# Patient Record
Sex: MALE | Race: ASIAN | NOT HISPANIC OR LATINO | Employment: FULL TIME | URBAN - METROPOLITAN AREA
[De-identification: names, ages, dates, MRNs, and addresses within clinical notes are randomized per-mention and may not be internally consistent; named-entity substitution may affect disease eponyms.]

---

## 2017-05-10 ENCOUNTER — TELEPHONE (OUTPATIENT)
Dept: LAB | Facility: CLINIC | Age: 47
End: 2017-05-10

## 2017-05-10 DIAGNOSIS — E11.9 DIABETES MELLITUS WITHOUT COMPLICATION (H): ICD-10-CM

## 2017-05-10 DIAGNOSIS — E78.5 HYPERLIPIDEMIA LDL GOAL <100: Primary | ICD-10-CM

## 2017-05-10 NOTE — TELEPHONE ENCOUNTER
Pt is only due for lipid, LFT, and microalbumin this time. Please check on with him if other doctor wants him to do the rest of them. Plus, there is no comprehensive vitamin panel I could offer. Please let him know  thx    .

## 2017-05-10 NOTE — TELEPHONE ENCOUNTER
Left message on answering machine for patient to call back.    Gladys BuenrostroRN  [facility]  [phone]

## 2017-05-11 DIAGNOSIS — E78.5 HYPERLIPIDEMIA LDL GOAL <100: ICD-10-CM

## 2017-05-11 DIAGNOSIS — E11.9 DIABETES MELLITUS WITHOUT COMPLICATION (H): ICD-10-CM

## 2017-05-11 LAB
ALBUMIN SERPL-MCNC: 4.2 G/DL (ref 3.4–5)
ALP SERPL-CCNC: 93 U/L (ref 40–150)
ALT SERPL W P-5'-P-CCNC: 52 U/L (ref 0–70)
AST SERPL W P-5'-P-CCNC: 23 U/L (ref 0–45)
BILIRUB DIRECT SERPL-MCNC: <0.1 MG/DL (ref 0–0.2)
BILIRUB SERPL-MCNC: 0.5 MG/DL (ref 0.2–1.3)
CHOLEST SERPL-MCNC: 205 MG/DL
CREAT UR-MCNC: 274 MG/DL
HDLC SERPL-MCNC: 39 MG/DL
LDLC SERPL CALC-MCNC: 141 MG/DL
MICROALBUMIN UR-MCNC: 17 MG/L
MICROALBUMIN/CREAT UR: 6.13 MG/G CR (ref 0–17)
NONHDLC SERPL-MCNC: 166 MG/DL
PROT SERPL-MCNC: 8 G/DL (ref 6.8–8.8)
TRIGL SERPL-MCNC: 127 MG/DL

## 2017-05-11 PROCEDURE — 80076 HEPATIC FUNCTION PANEL: CPT | Performed by: FAMILY MEDICINE

## 2017-05-11 PROCEDURE — 82043 UR ALBUMIN QUANTITATIVE: CPT | Performed by: FAMILY MEDICINE

## 2017-05-11 PROCEDURE — 80061 LIPID PANEL: CPT | Performed by: FAMILY MEDICINE

## 2017-05-11 PROCEDURE — 36415 COLL VENOUS BLD VENIPUNCTURE: CPT | Performed by: FAMILY MEDICINE

## 2017-05-11 NOTE — TELEPHONE ENCOUNTER
jacqueline notified of Dr. Samuels message- he will schedule appointment next week to discuss labs and other questions  '  Gladys Buenrostro RN  [facility]  [phone]

## 2017-05-15 ENCOUNTER — OFFICE VISIT (OUTPATIENT)
Dept: FAMILY MEDICINE | Facility: CLINIC | Age: 47
End: 2017-05-15
Payer: COMMERCIAL

## 2017-05-15 VITALS
SYSTOLIC BLOOD PRESSURE: 124 MMHG | TEMPERATURE: 97.9 F | DIASTOLIC BLOOD PRESSURE: 80 MMHG | BODY MASS INDEX: 24.66 KG/M2 | WEIGHT: 134 LBS | HEART RATE: 68 BPM | HEIGHT: 62 IN | OXYGEN SATURATION: 98 %

## 2017-05-15 DIAGNOSIS — Z82.49 FAMILY HISTORY OF EARLY CAD: ICD-10-CM

## 2017-05-15 DIAGNOSIS — E11.9 DIABETES MELLITUS WITHOUT COMPLICATION (H): ICD-10-CM

## 2017-05-15 DIAGNOSIS — E78.5 HYPERLIPIDEMIA LDL GOAL <100: Primary | ICD-10-CM

## 2017-05-15 PROCEDURE — 99213 OFFICE O/P EST LOW 20 MIN: CPT | Performed by: FAMILY MEDICINE

## 2017-05-15 NOTE — NURSING NOTE
"Chief Complaint   Patient presents with     Results       Initial /80 (BP Location: Left arm, Patient Position: Chair, Cuff Size: Adult Regular)  Pulse 68  Temp 97.9  F (36.6  C) (Tympanic)  Ht 5' 1.5\" (1.562 m)  Wt 134 lb (60.8 kg)  SpO2 98%  BMI 24.91 kg/m2 Estimated body mass index is 24.91 kg/(m^2) as calculated from the following:    Height as of this encounter: 5' 1.5\" (1.562 m).    Weight as of this encounter: 134 lb (60.8 kg).  Medication Reconciliation: complete     Solange Rascon CMA      "

## 2017-05-15 NOTE — PROGRESS NOTES
SUBJECTIVE:                                                    Tanner Olvera is a 46 year old male who presents to clinic today for the following health issues:      Lab results        Problem list and histories reviewed & adjusted, as indicated.  Additional history: as documented    Patient Active Problem List   Diagnosis     Hyperlipidemia LDL goal <100     Family history of early CAD     Degenerative disc disease     Vitamin D deficiency     Abnormal thyroid blood test     Diabetes mellitus without complication (H)     Past Surgical History:   Procedure Laterality Date     NO HISTORY OF SURGERY         Social History   Substance Use Topics     Smoking status: Never Smoker     Smokeless tobacco: Never Used     Alcohol use No      Comment: nothing     Family History   Problem Relation Age of Onset     C.A.D. Father      Asthma No family hx of      DIABETES No family hx of      Hypertension No family hx of      Cancer - colorectal No family hx of      Prostate Cancer No family hx of      Anesthesia Reaction No family hx of      Blood Disease No family hx of      Eye Disorder No family hx of      OSTEOPOROSIS No family hx of      Thyroid Disease No family hx of          Current Outpatient Prescriptions   Medication Sig Dispense Refill     aspirin 81 MG tablet Take 81 mg by mouth daily       Allergies   Allergen Reactions     Penicillins Rash     Recent Labs   Lab Test  05/11/17   0926  12/23/16   1110  10/13/16   1030  07/18/16   0900  12/17/14   0806   A1C   --   6.1*   --   6.1*  5.5   LDL  141*  144*   --   127*  95   HDL  39*  38*   --   36*  46   TRIG  127  145   --   161*  153*   ALT  52  65   --   56  45   CR   --   0.90  0.77  0.94  0.91   GFRESTIMATED   --   >90  Non  GFR Calc    >90  Non  GFR Calc    87  >90  Non  GFR Calc     GFRESTBLACK   --   >90   GFR Calc    >90   GFR Calc    >90   GFR Calc     ">90   GFR Calc     POTASSIUM   --   3.9  3.9  4.0  4.4   TSH   --   4.66*  3.62  4.10*  3.07      BP Readings from Last 3 Encounters:   05/15/17 124/80   12/22/16 132/84   10/14/16 122/70    Wt Readings from Last 3 Encounters:   05/15/17 134 lb (60.8 kg)   12/22/16 129 lb (58.5 kg)   10/14/16 127 lb (57.6 kg)                    Reviewed and updated as needed this visit by clinical staff       Reviewed and updated as needed this visit by Provider         ROS:  Constitutional, HEENT, cardiovascular, pulmonary, gi and gu systems are negative, except as otherwise noted.    OBJECTIVE:                                                    /80 (BP Location: Left arm, Patient Position: Chair, Cuff Size: Adult Regular)  Pulse 68  Temp 97.9  F (36.6  C) (Tympanic)  Ht 5' 1.5\" (1.562 m)  Wt 134 lb (60.8 kg)  SpO2 98%  BMI 24.91 kg/m2  Body mass index is 24.91 kg/(m^2).  GENERAL: healthy, alert and no distress  NECK: no adenopathy, no asymmetry, masses, or scars and thyroid normal to palpation  RESP: lungs clear to auscultation - no rales, rhonchi or wheezes  CV: regular rate and rhythm, normal S1 S2, no S3 or S4, no murmur, click or rub, no peripheral edema and peripheral pulses strong  ABDOMEN: soft, nontender, no hepatosplenomegaly, no masses and bowel sounds normal  MS: no gross musculoskeletal defects noted, no edema         ASSESSMENT/PLAN:                                                    Tanner was seen today for results.    Diagnoses and all orders for this visit:    Hyperlipidemia LDL goal <100    Family history of early CAD    Diabetes mellitus without complication (H)      Has slightly elevated LDL but otherwise very stable results on recent lab test  Will have him to keep working on cardio exercise and rehydration, also encourage him to keep taking B complex and vitamin D, also encouraged him to add Omega 3 fatty acid(plant based)  Will recheck fasting lab in December(CPE)     Vishal Samuels, " MD  [facility] [place]

## 2017-05-15 NOTE — MR AVS SNAPSHOT
"              After Visit Summary   5/15/2017    Tanner Olvera    MRN: [id number]           Patient Information     Date Of Birth          1970        Visit Information        Provider Department      5/15/2017 2:40 PM Vishal Samuels MD [facility] [place]        Today's Diagnoses     Hyperlipidemia LDL goal <100    -  1    Family history of early CAD        Diabetes mellitus without complication (H)           Follow-ups after your visit        Who to contact     If you have questions or need follow up information about today's clinic visit or your schedule please contact [facility] [place] directly at [phone].  Normal or non-critical lab and imaging results will be communicated to you by paOndehart, letter or phone within 4 business days after the clinic has received the results. If you do not hear from us within 7 days, please contact the clinic through paOndehart or phone. If you have a critical or abnormal lab result, we will notify you by phone as soon as possible.  Submit refill requests through [employer] or call your pharmacy and they will forward the refill request to us. Please allow 3 business days for your refill to be completed.          Additional Information About Your Visit        MyChart Information     [employer] gives you secure access to your electronic health record. If you see a primary care provider, you can also send messages to your care team and make appointments. If you have questions, please call your primary care clinic.  If you do not have a primary care provider, please call [phone] and they will assist you.        Care EveryWhere ID     This is your Care EveryWhere ID. This could be used by other organizations to access your [facility] medical records  [id number]        Your Vitals Were     Pulse Temperature Height Pulse Oximetry BMI (Body Mass Index)       68 97.9  F (36.6  C) (Tympanic) 5' 1.5\" (1.562 m) 98% 24.91 kg/m2        Blood Pressure from Last 3 " Encounters:   05/15/17 124/80   12/22/16 132/84   10/14/16 122/70    Weight from Last 3 Encounters:   05/15/17 134 lb (60.8 kg)   12/22/16 129 lb (58.5 kg)   10/14/16 127 lb (57.6 kg)              Today, you had the following     No orders found for display       Primary Care Provider Office Phone # Fax #    Vishal Samuels [phone]       [address] [address]        Thank you!     Thank you for choosing [facility]  for your care. Our goal is always to provide you with excellent care. Hearing back from our patients is one way we can continue to improve our services. Please take a few minutes to complete the written survey that you may receive in the mail after your visit with us. Thank you!             Your Updated Medication List - Protect others around you: Learn how to safely use, store and throw away your medicines at www.disposemymeds.org.          This list is accurate as of: 5/15/17  3:02 PM.  Always use your most recent med list.                   Brand Name Dispense Instructions for use    aspirin 81 MG tablet      Take 81 mg by mouth daily

## 2017-12-14 ENCOUNTER — OFFICE VISIT (OUTPATIENT)
Dept: FAMILY MEDICINE | Facility: CLINIC | Age: 47
End: 2017-12-14
Payer: COMMERCIAL

## 2017-12-14 VITALS
RESPIRATION RATE: 16 BRPM | HEIGHT: 62 IN | WEIGHT: 133 LBS | TEMPERATURE: 97.8 F | HEART RATE: 100 BPM | DIASTOLIC BLOOD PRESSURE: 80 MMHG | BODY MASS INDEX: 24.48 KG/M2 | SYSTOLIC BLOOD PRESSURE: 119 MMHG | OXYGEN SATURATION: 99 %

## 2017-12-14 DIAGNOSIS — E11.9 DIABETES MELLITUS WITHOUT COMPLICATION (H): ICD-10-CM

## 2017-12-14 DIAGNOSIS — Z00.00 HEALTH CARE MAINTENANCE: Primary | ICD-10-CM

## 2017-12-14 DIAGNOSIS — Z13.89 SCREENING FOR DIABETIC PERIPHERAL NEUROPATHY: ICD-10-CM

## 2017-12-14 PROCEDURE — 99396 PREV VISIT EST AGE 40-64: CPT | Performed by: FAMILY MEDICINE

## 2017-12-14 NOTE — NURSING NOTE
"Chief Complaint   Patient presents with     Physical       Initial /80 (Cuff Size: Adult Regular)  Pulse 100  Temp 97.8  F (36.6  C) (Tympanic)  Resp 16  Ht 5' 1.5\" (1.562 m)  Wt 133 lb (60.3 kg)  SpO2 99%  BMI 24.72 kg/m2 Estimated body mass index is 24.72 kg/(m^2) as calculated from the following:    Height as of this encounter: 5' 1.5\" (1.562 m).    Weight as of this encounter: 133 lb (60.3 kg).  Medication Reconciliation: complete   Mary White, CMA    "

## 2017-12-14 NOTE — MR AVS SNAPSHOT
After Visit Summary   12/14/2017    Tanner Olvera    MRN: [id number]           Patient Information     Date Of Birth          1970        Visit Information        Provider Department      12/14/2017 3:00 PM Vishal Samuels MD [facility]        Today's Diagnoses     Health care maintenance    -  1    Screening for diabetic peripheral neuropathy        Diabetes mellitus without complication (H)          Care Instructions      Preventive Health Recommendations  Male Ages 40 to 49    Yearly exam:             See your health care provider every year in order to  o   Review health changes.   o   Discuss preventive care.    o   Review your medicines if your doctor has prescribed any.    You should be tested each year for STDs (sexually transmitted diseases) if you re at risk.     Have a cholesterol test every 5 years.     Have a colonoscopy (test for colon cancer) if someone in your family has had colon cancer or polyps before age 50.     After age 45, have a diabetes test (fasting glucose). If you are at risk for diabetes, you should have this test every 3 years.      Talk with your health care provider about whether or not a prostate cancer screening test (PSA) is right for you.    Shots: Get a flu shot each year. Get a tetanus shot every 10 years.     Nutrition:    Eat at least 5 servings of fruits and vegetables daily.     Eat whole-grain bread, whole-wheat pasta and brown rice instead of white grains and rice.     Talk to your provider about Calcium and Vitamin D.     Lifestyle    Exercise for at least 150 minutes a week (30 minutes a day, 5 days a week). This will help you control your weight and prevent disease.     Limit alcohol to one drink per day.     No smoking.     Wear sunscreen to prevent skin cancer.     See your dentist every six months for an exam and cleaning.              Follow-ups after your visit        Additional Services     OPHTHALMOLOGY ADULT REFERRAL        Your provider has referred you to: [facility]: [facility] [phone] [email or web address]    Please be aware that coverage of these services is subject to the terms and limitations of your health insurance plan.  Call member services at your health plan with any benefit or coverage questions.      Please bring the following with you to your appointment:    (1) Any X-Rays, CTs or MRIs which have been performed.  Contact the facility where they were done to arrange for  prior to your scheduled appointment.    (2) List of current medications  (3) This referral request   (4) Any documents/labs given to you for this referral                  Follow-up notes from your care team     Return in about 6 months (around 6/14/2018) for Routine Visit.      Your next 10 appointments already scheduled     Dec 15, 2017  8:15 AM CST   LAB with EC LAB   [facility] [place] ([facility] [place])    [address] [place] [id number] [phone]           OUTSIDE LABS: Please include name of facility and Physician that is requesting outside labs be drawn.  Please indicate if labs are fasting or non-fasting on appt notes.  Be as specific as you can on which labs are being drawn.              Future tests that were ordered for you today     Open Future Orders        Priority Expected Expires Ordered    HEMOGLOBIN A1C Routine  12/14/2018 12/14/2017    CBC with platelets Routine  12/14/2018 12/14/2017    Comprehensive metabolic panel Routine  12/14/2018 12/14/2017    Lipid panel reflex to direct LDL Fasting Routine  12/14/2018 12/14/2017    TSH with free T4 reflex Routine  12/14/2018 12/14/2017            Who to contact     If you have questions or need follow up information about today's clinic visit or your schedule please contact [facility] [place] directly at [phone].  Normal or non-critical lab and imaging results will be communicated  "to you by Elyssafregorihart, letter or phone within 4 business days after the clinic has received the results. If you do not hear from us within 7 days, please contact the clinic through idemama or phone. If you have a critical or abnormal lab result, we will notify you by phone as soon as possible.  Submit refill requests through idemama or call your pharmacy and they will forward the refill request to us. Please allow 3 business days for your refill to be completed.          Additional Information About Your Visit        idemama Information     idemama gives you secure access to your electronic health record. If you see a primary care provider, you can also send messages to your care team and make appointments. If you have questions, please call your primary care clinic.  If you do not have a primary care provider, please call [phone] and they will assist you.        Care EveryWhere ID     This is your Care EveryWhere ID. This could be used by other organizations to access your [facility] medical records  [id number]        Your Vitals Were     Pulse Temperature Respirations Height Pulse Oximetry BMI (Body Mass Index)    100 97.8  F (36.6  C) (Tympanic) 16 5' 1.5\" (1.562 m) 99% 24.72 kg/m2       Blood Pressure from Last 3 Encounters:   12/14/17 119/80   05/15/17 124/80   12/22/16 132/84    Weight from Last 3 Encounters:   12/14/17 133 lb (60.3 kg)   05/15/17 134 lb (60.8 kg)   12/22/16 129 lb (58.5 kg)              We Performed the Following     FOOT EXAM  NO CHARGE [95944.114]     OPHTHALMOLOGY ADULT REFERRAL        Primary Care Provider Office Phone # Fax #    Vishal SHARONDA Samuels [phone]       [address] [place] [address]        Equal Access to Services     FLORIN CABRERA AH: Hadii jina [place], warejida jeancarlosqadaha, qaybta kaalmada adeegyada, griselda ballard. So [facility] [phone].    ATENCIÓN: Si habla español, tiene a sheikh disposición servicios gratuitos de " asistencia lingüística. Renetta al [phone].    We comply with applicable federal civil rights laws and [place] laws. We do not discriminate on the basis of race, color, national origin, age, disability, sex, sexual orientation, or gender identity.            Thank you!     Thank you for choosing [facility] [place]  for your care. Our goal is always to provide you with excellent care. Hearing back from our patients is one way we can continue to improve our services. Please take a few minutes to complete the written survey that you may receive in the mail after your visit with us. Thank you!             Your Updated Medication List - Protect others around you: Learn how to safely use, store and throw away your medicines at www.disposemymeds.org.          This list is accurate as of: 12/14/17  3:33 PM.  Always use your most recent med list.                   Brand Name Dispense Instructions for use Diagnosis    aspirin 81 MG tablet      Take 81 mg by mouth daily        MULTIVITAMIN PO      Take by mouth daily        SUPER B COMPLEX PO      Take by mouth daily

## 2017-12-14 NOTE — PROGRESS NOTES
SUBJECTIVE:   CC: Tanner Olvera is an 47 year old male who presents for preventative health visit.     Physical   Annual:     Getting at least 3 servings of Calcium per day::  NO    Bi-annual eye exam::  Yes    Dental care twice a year::  Yes    Sleep apnea or symptoms of sleep apnea::  Excessive snoring    Diet::  Vegetarian/vegan and Breakfast skipped    Frequency of exercise::  None    Taking medications regularly::  Yes    Medication side effects::  None    Additional concerns today::  No              Today's PHQ-2 Score: PHQ-2 ( 1999 [employer]) 12/13/2017   Q1: Little interest or pleasure in doing things 0   Q2: Feeling down, depressed or hopeless 0   PHQ-2 Score 0   Q1: Little interest or pleasure in doing things Not at all   Q2: Feeling down, depressed or hopeless Not at all   PHQ-2 Score 0       Abuse: Current or Past(Physical, Sexual or Emotional)- No  Do you feel safe in your environment - Yes    Social History   Substance Use Topics     Smoking status: Never Smoker     Smokeless tobacco: Never Used     Alcohol use No      Comment: nothing     Alcohol Use 12/13/2017   If you drink alcohol, do you typically have greater than 3 drinks per day OR greater than 7 drinks per week?   Not applicable   No flowsheet data found.      Last PSA: No results found for: PSA    Reviewed orders with patient. Reviewed health maintenance and updated orders accordingly - Yes  BP Readings from Last 3 Encounters:   12/14/17 119/80   05/15/17 124/80   12/22/16 132/84    Wt Readings from Last 3 Encounters:   12/14/17 133 lb (60.3 kg)   05/15/17 134 lb (60.8 kg)   12/22/16 129 lb (58.5 kg)                  Patient Active Problem List   Diagnosis     Hyperlipidemia LDL goal <100     Family history of early CAD     Degenerative disc disease     Vitamin D deficiency     Abnormal thyroid blood test     Diabetes mellitus without complication (H)     Past Surgical History:   Procedure Laterality Date     NO HISTORY OF SURGERY          Social History   Substance Use Topics     Smoking status: Never Smoker     Smokeless tobacco: Never Used     Alcohol use No      Comment: nothing     Family History   Problem Relation Age of Onset     C.A.D. Father      Asthma No family hx of      DIABETES No family hx of      Hypertension No family hx of      Cancer - colorectal No family hx of      Prostate Cancer No family hx of      Anesthesia Reaction No family hx of      Blood Disease No family hx of      Eye Disorder No family hx of      OSTEOPOROSIS No family hx of      Thyroid Disease No family hx of          Current Outpatient Prescriptions   Medication Sig Dispense Refill     Multiple Vitamins-Minerals (MULTIVITAMIN PO) Take by mouth daily       B Complex-C (SUPER B COMPLEX PO) Take by mouth daily       aspirin 81 MG tablet Take 81 mg by mouth daily       Allergies   Allergen Reactions     Penicillins Rash     Recent Labs   Lab Test  05/11/17   0926  12/23/16   1110  10/13/16   1030  07/18/16   0900  12/17/14   0806   A1C   --   6.1*   --   6.1*  5.5   LDL  141*  144*   --   127*  95   HDL  39*  38*   --   36*  46   TRIG  127  145   --   161*  153*   ALT  52  65   --   56  45   CR   --   0.90  0.77  0.94  0.91   GFRESTIMATED   --   >90  Non  GFR Calc    >90  Non  GFR Calc    87  >90  Non  GFR Calc     GFRESTBLACK   --   >90   GFR Calc    >90   GFR Calc    >90   GFR Calc    >90   GFR Calc     POTASSIUM   --   3.9  3.9  4.0  4.4   TSH   --   4.66*  3.62  4.10*  3.07              Reviewed and updated as needed this visit by clinical staff         Reviewed and updated as needed this visit by Provider        Past Medical History:   Diagnosis Date     Chest pain 12/7/2010     Dandruff 12/7/2010     Degenerative disc disease 1/1/2003     Hyperlipidemia LDL goal <100 9/2/2011     Left hemiparesis (H) 12/22/2016     LFTs abnormal 6/3/2011     (Problem  "list name updated by automated process. Provider to review and confirm.)     Sensory hearing loss, bilateral 1/11     Skin tags 12/7/2010     Type 2 diabetes, HbA1c goal < 7% (H) 9/2/2011     Vertigo 12/7/2010      Past Surgical History:   Procedure Laterality Date     NO HISTORY OF SURGERY     Review of Systems  C: NEGATIVE for fever, chills, change in weight  I: NEGATIVE for worrisome rashes, moles or lesions  E: NEGATIVE for vision changes or irritation  ENT: NEGATIVE for ear, mouth and throat problems  R: NEGATIVE for significant cough or SOB  CV: NEGATIVE for chest pain, palpitations or peripheral edema  GI: NEGATIVE for nausea, abdominal pain, heartburn, or change in bowel habits   male: negative for dysuria, hematuria, decreased urinary stream, erectile dysfunction, urethral discharge  M: NEGATIVE for significant arthralgias or myalgia  N: NEGATIVE for weakness, dizziness or paresthesias  P: NEGATIVE for changes in mood or affect    OBJECTIVE:   /80 (Cuff Size: Adult Regular)  Pulse 100  Temp 97.8  F (36.6  C) (Tympanic)  Resp 16  Ht 5' 1.5\" (1.562 m)  Wt 133 lb (60.3 kg)  SpO2 99%  BMI 24.72 kg/m2    Physical Exam  GENERAL: healthy, alert and no distress  EYES: Eyes grossly normal to inspection, PERRL and conjunctivae and sclerae normal  HENT: ear canals and TM's normal, nose and mouth without ulcers or lesions  NECK: no adenopathy, no asymmetry, masses, or scars and thyroid normal to palpation  RESP: lungs clear to auscultation - no rales, rhonchi or wheezes  CV: regular rate and rhythm, normal S1 S2, no S3 or S4, no murmur, click or rub, no peripheral edema and peripheral pulses strong  ABDOMEN: soft, nontender, no hepatosplenomegaly, no masses and bowel sounds normal   (male): normal male genitalia without lesions or urethral discharge, no hernia  MS: no gross musculoskeletal defects noted, no edema  SKIN: no suspicious lesions or rashes  NEURO: Normal strength and tone, mentation intact " "and speech normal  BACK: no CVA tenderness, no paralumbar tenderness  PSYCH: mentation appears normal, affect normal/bright  LYMPH: no cervical, supraclavicular, axillary, or inguinal adenopathy    ASSESSMENT/PLAN:       ICD-10-CM    1. Health care maintenance Z00.00 FOOT EXAM  NO CHARGE [80811.114]     HEMOGLOBIN A1C     CBC with platelets     Comprehensive metabolic panel     Lipid panel reflex to direct LDL Fasting     TSH with free T4 reflex   2. Screening for diabetic peripheral neuropathy Z13.89 FOOT EXAM  NO CHARGE [81751.114]   3. Diabetes mellitus without complication (H) E11.9 FOOT EXAM  NO CHARGE [29705.114]     HEMOGLOBIN A1C     CBC with platelets     Comprehensive metabolic panel     Lipid panel reflex to direct LDL Fasting     TSH with free T4 reflex     OPHTHALMOLOGY ADULT REFERRAL       COUNSELING:   Reviewed preventive health counseling, as reflected in patient instructions           reports that he has never smoked. He has never used smokeless tobacco.      Estimated body mass index is 24.91 kg/(m^2) as calculated from the following:    Height as of 5/15/17: 5' 1.5\" (1.562 m).    Weight as of 5/15/17: 134 lb (60.8 kg).         Counseling Resources:  ATP IV Guidelines  Pooled Cohorts Equation Calculator  FRAX Risk Assessment  ICSI Preventive Guidelines  Dietary Guidelines for Americans, 2010  USDA's MyPlate  ASA Prophylaxis  Lung CA Screening    Vishal Samuels MD  [facility] for HPI/ROS submitted by the patient on 12/13/2017   PHQ-2 Score: 0    "

## 2017-12-15 DIAGNOSIS — E11.9 DIABETES MELLITUS WITHOUT COMPLICATION (H): ICD-10-CM

## 2017-12-15 DIAGNOSIS — Z00.00 HEALTH CARE MAINTENANCE: ICD-10-CM

## 2017-12-15 LAB
ERYTHROCYTE [DISTWIDTH] IN BLOOD BY AUTOMATED COUNT: 13.1 % (ref 10–15)
HBA1C MFR BLD: 6.3 % (ref 4.3–6)
HCT VFR BLD AUTO: 43.5 % (ref 40–53)
HGB BLD-MCNC: 14.1 G/DL (ref 13.3–17.7)
MCH RBC QN AUTO: 27.8 PG (ref 26.5–33)
MCHC RBC AUTO-ENTMCNC: 32.4 G/DL (ref 31.5–36.5)
MCV RBC AUTO: 86 FL (ref 78–100)
PLATELET # BLD AUTO: 317 10E9/L (ref 150–450)
RBC # BLD AUTO: 5.08 10E12/L (ref 4.4–5.9)
WBC # BLD AUTO: 6.9 10E9/L (ref 4–11)

## 2017-12-15 PROCEDURE — 84439 ASSAY OF FREE THYROXINE: CPT | Performed by: FAMILY MEDICINE

## 2017-12-15 PROCEDURE — 85027 COMPLETE CBC AUTOMATED: CPT | Performed by: FAMILY MEDICINE

## 2017-12-15 PROCEDURE — 83036 HEMOGLOBIN GLYCOSYLATED A1C: CPT | Performed by: FAMILY MEDICINE

## 2017-12-15 PROCEDURE — 84443 ASSAY THYROID STIM HORMONE: CPT | Performed by: FAMILY MEDICINE

## 2017-12-15 PROCEDURE — 80061 LIPID PANEL: CPT | Performed by: FAMILY MEDICINE

## 2017-12-15 PROCEDURE — 80053 COMPREHEN METABOLIC PANEL: CPT | Performed by: FAMILY MEDICINE

## 2017-12-15 PROCEDURE — 36415 COLL VENOUS BLD VENIPUNCTURE: CPT | Performed by: FAMILY MEDICINE

## 2017-12-16 LAB
ALBUMIN SERPL-MCNC: 3.4 G/DL (ref 3.4–5)
ALP SERPL-CCNC: 102 U/L (ref 40–150)
ALT SERPL W P-5'-P-CCNC: 57 U/L (ref 0–70)
ANION GAP SERPL CALCULATED.3IONS-SCNC: 7 MMOL/L (ref 3–14)
AST SERPL W P-5'-P-CCNC: 24 U/L (ref 0–45)
BILIRUB SERPL-MCNC: 0.3 MG/DL (ref 0.2–1.3)
BUN SERPL-MCNC: 8 MG/DL (ref 7–30)
CALCIUM SERPL-MCNC: 8.6 MG/DL (ref 8.5–10.1)
CHLORIDE SERPL-SCNC: 107 MMOL/L (ref 94–109)
CHOLEST SERPL-MCNC: 184 MG/DL
CO2 SERPL-SCNC: 26 MMOL/L (ref 20–32)
CREAT SERPL-MCNC: 0.77 MG/DL (ref 0.66–1.25)
GFR SERPL CREATININE-BSD FRML MDRD: >90 ML/MIN/1.7M2
GLUCOSE SERPL-MCNC: 102 MG/DL (ref 70–99)
HDLC SERPL-MCNC: 32 MG/DL
LDLC SERPL CALC-MCNC: 121 MG/DL
NONHDLC SERPL-MCNC: 152 MG/DL
POTASSIUM SERPL-SCNC: 4.5 MMOL/L (ref 3.4–5.3)
PROT SERPL-MCNC: 7.1 G/DL (ref 6.8–8.8)
SODIUM SERPL-SCNC: 140 MMOL/L (ref 133–144)
T4 FREE SERPL-MCNC: 1.09 NG/DL (ref 0.76–1.46)
TRIGL SERPL-MCNC: 157 MG/DL
TSH SERPL DL<=0.005 MIU/L-ACNC: 4.65 MU/L (ref 0.4–4)

## 2017-12-20 ENCOUNTER — TRANSFERRED RECORDS (OUTPATIENT)
Dept: HEALTH INFORMATION MANAGEMENT | Facility: CLINIC | Age: 47
End: 2017-12-20

## 2018-11-19 ENCOUNTER — OFFICE VISIT (OUTPATIENT)
Dept: FAMILY MEDICINE | Facility: CLINIC | Age: 48
End: 2018-11-19
Payer: COMMERCIAL

## 2018-11-19 VITALS
HEIGHT: 62 IN | HEART RATE: 102 BPM | OXYGEN SATURATION: 99 % | DIASTOLIC BLOOD PRESSURE: 85 MMHG | BODY MASS INDEX: 24.29 KG/M2 | WEIGHT: 132 LBS | SYSTOLIC BLOOD PRESSURE: 136 MMHG | RESPIRATION RATE: 14 BRPM | TEMPERATURE: 99.1 F

## 2018-11-19 DIAGNOSIS — E55.9 VITAMIN D DEFICIENCY: ICD-10-CM

## 2018-11-19 DIAGNOSIS — E78.5 HYPERLIPIDEMIA LDL GOAL <100: ICD-10-CM

## 2018-11-19 DIAGNOSIS — E11.9 DIABETES MELLITUS WITHOUT COMPLICATION (H): Primary | ICD-10-CM

## 2018-11-19 DIAGNOSIS — Z23 NEED FOR PROPHYLACTIC VACCINATION AND INOCULATION AGAINST INFLUENZA: ICD-10-CM

## 2018-11-19 PROCEDURE — 90686 IIV4 VACC NO PRSV 0.5 ML IM: CPT | Performed by: FAMILY MEDICINE

## 2018-11-19 PROCEDURE — 90471 IMMUNIZATION ADMIN: CPT | Performed by: FAMILY MEDICINE

## 2018-11-19 PROCEDURE — 99214 OFFICE O/P EST MOD 30 MIN: CPT | Mod: 25 | Performed by: FAMILY MEDICINE

## 2018-11-19 NOTE — PROGRESS NOTES
SUBJECTIVE:   Tanner Olvera is a 48 year old male who presents to clinic today for the following health issues:      Musculoskeletal problem/pain      Duration: 4 months     Description  Location: shoulders, hands     Intensity:  moderate    Accompanying signs and symptoms: none    History  Previous similar problem: last year   Previous evaluation:  none    Precipitating or alleviating factors:  Trauma or overuse: no   Aggravating factors include: none    Therapies tried and outcome: nothing    Problem list and histories reviewed & adjusted, as indicated.  Additional history: as documented    Patient Active Problem List   Diagnosis     Hyperlipidemia LDL goal <100     Family history of early CAD     Degenerative disc disease     Vitamin D deficiency     Abnormal thyroid blood test     Diabetes mellitus without complication (H)     Past Surgical History:   Procedure Laterality Date     NO HISTORY OF SURGERY         Social History   Substance Use Topics     Smoking status: Never Smoker     Smokeless tobacco: Never Used     Alcohol use No      Comment: nothing     Family History   Problem Relation Age of Onset     C.A.D. Father      Asthma No family hx of      Diabetes No family hx of      Hypertension No family hx of      Cancer - colorectal No family hx of      Prostate Cancer No family hx of      Anesthesia Reaction No family hx of      Blood Disease No family hx of      Eye Disorder No family hx of      Osteoporosis No family hx of      Thyroid Disease No family hx of          Current Outpatient Prescriptions   Medication Sig Dispense Refill     aspirin 81 MG tablet Take 81 mg by mouth daily       B Complex-C (SUPER B COMPLEX PO) Take by mouth daily       Multiple Vitamins-Minerals (MULTIVITAMIN PO) Take by mouth daily       Allergies   Allergen Reactions     Penicillins Rash     Recent Labs   Lab Test  12/15/17   0810  05/11/17   0926  12/23/16   1110   07/18/16   0900   A1C  6.3*   --   6.1*   --   6.1*  "  LDL  121*  141*  144*   --   127*   HDL  32*  39*  38*   --   36*   TRIG  157*  127  145   --   161*   ALT  57  52  65   --   56   CR  0.77   --   0.90   < >  0.94   GFRESTIMATED  >90   --   >90  Non  GFR Calc     < >  87   GFRESTBLACK  >90   --   >90   GFR Calc     < >  >90   GFR Calc     POTASSIUM  4.5   --   3.9   < >  4.0   TSH  4.65*   --   4.66*   < >  4.10*    < > = values in this interval not displayed.      BP Readings from Last 3 Encounters:   11/19/18 136/85   12/14/17 119/80   05/15/17 124/80    Wt Readings from Last 3 Encounters:   11/19/18 132 lb (59.9 kg)   12/14/17 133 lb (60.3 kg)   05/15/17 134 lb (60.8 kg)                    Reviewed and updated as needed this visit by clinical staff       Reviewed and updated as needed this visit by Provider         ROS:  Constitutional, HEENT, cardiovascular, pulmonary, gi and gu systems are negative, except as otherwise noted.    OBJECTIVE:     /85 (Cuff Size: Adult Regular)  Pulse 102  Temp 99.1  F (37.3  C) (Tympanic)  Resp 14  Ht 5' 1.5\" (1.562 m)  Wt 132 lb (59.9 kg)  SpO2 99%  BMI 24.54 kg/m2  Body mass index is 24.54 kg/(m^2).  GENERAL: healthy, alert and no distress  NECK: no adenopathy, no asymmetry, masses, or scars and thyroid normal to palpation  RESP: lungs clear to auscultation - no rales, rhonchi or wheezes  CV: regular rate and rhythm, normal S1 S2, no S3 or S4, no murmur, click or rub, no peripheral edema and peripheral pulses strong  ABDOMEN: soft, nontender, no hepatosplenomegaly, no masses and bowel sounds normal  MS: no gross musculoskeletal defects noted, no edema        ASSESSMENT/PLAN:   ASSESSMENT / PLAN:  (E11.9) Diabetes mellitus without complication (H)  (primary encounter diagnosis)  Comment: has been stable with current dose of meds, has no side effect from the meds, will keep watching sx  Plan: OPHTHALMOLOGY ADULT REFERRAL, TSH with free T4         reflex, ESR: " Erythrocyte sedimentation rate,         CRP, inflammation, Vitamin D Deficiency, Lipid         panel reflex to direct LDL Fasting, Hemoglobin         A1c, Comprehensive metabolic panel, CBC with         platelets, Vitamin B12            (E55.9) Vitamin D deficiency  Comment: has been off of med, started having bone and joint pain with stiffness, will have him to check lab for further evaluation   Plan: OPHTHALMOLOGY ADULT REFERRAL, TSH with free T4         reflex, ESR: Erythrocyte sedimentation rate,         CRP, inflammation, Vitamin D Deficiency, Lipid         panel reflex to direct LDL Fasting, Hemoglobin         A1c, Comprehensive metabolic panel, CBC with         platelets, Vitamin B12            (E78.5) Hyperlipidemia LDL goal <100  Comment: stable   Plan: OPHTHALMOLOGY ADULT REFERRAL, TSH with free T4         reflex, ESR: Erythrocyte sedimentation rate,         CRP, inflammation, Vitamin D Deficiency, Lipid         panel reflex to direct LDL Fasting, Hemoglobin         A1c, Comprehensive metabolic panel, CBC with         platelets, Vitamin B12                FUTURE APPOINTMENTS:       - Follow-up visit in 1 month for EVELIA Samuels MD  [facility] [place]

## 2018-11-19 NOTE — MR AVS SNAPSHOT
After Visit Summary   11/19/2018    Tanner Olvera    MRN: [id number]           Patient Information     Date Of Birth          1970        Visit Information        Provider Department      11/19/2018 1:40 PM Vishal Samuels MD [facility] [place] [facility]        Today's Diagnoses     Diabetes mellitus without complication (H)    -  1    Vitamin D deficiency        Hyperlipidemia LDL goal <100           Follow-ups after your visit        Additional Services     OPHTHALMOLOGY ADULT REFERRAL       Your provider has referred you to: N: [facility] [place] [phone] [email or web address]    Please be aware that coverage of these services is subject to the terms and limitations of your health insurance plan.  Call member services at your health plan with any benefit or coverage questions.      Please bring the following with you to your appointment:    (1) Any X-Rays, CTs or MRIs which have been performed.  Contact the facility where they were done to arrange for  prior to your scheduled appointment.    (2) List of current medications  (3) This referral request   (4) Any documents/labs given to you for this referral                  Follow-up notes from your care team     Return in about 1 month (around 12/19/2018) for Routine Visit, Physical Exam.      Your next 10 appointments already scheduled     Nov 26, 2018  8:15 AM CST   LAB with EC LAB   [facility] ([facility])    [address] [place] [id number] [phone]           OUTSIDE LABS: Please include name of facility and Physician that is requesting outside labs be drawn.  Please indicate if labs are fasting or non-fasting on appt notes.  Be as specific as you can on which labs are being drawn.              Future tests that were ordered for you today     Open Future Orders        Priority Expected Expires Ordered    Lipid panel reflex to direct  "LDL Fasting Routine  11/19/2019 11/19/2018    Hemoglobin A1c Routine  11/19/2019 11/19/2018    Comprehensive metabolic panel Routine  11/19/2019 11/19/2018    CBC with platelets Routine  11/19/2019 11/19/2018    Vitamin B12 Routine  11/19/2019 11/19/2018    TSH with free T4 reflex Routine  11/19/2019 11/19/2018    ESR: Erythrocyte sedimentation rate Routine  11/19/2019 11/19/2018    CRP, inflammation Routine  11/19/2019 11/19/2018    Vitamin D Deficiency Routine  11/19/2019 11/19/2018            Who to contact     If you have questions or need follow up information about today's clinic visit or your schedule please contact [facility] directly at [phone].  Normal or non-critical lab and imaging results will be communicated to you by MyChart, letter or phone within 4 business days after the clinic has received the results. If you do not hear from us within 7 days, please contact the clinic through [facility] or phone. If you have a critical or abnormal lab result, we will notify you by phone as soon as possible.  Submit refill requests through [facility] [employer] [facility] or call your pharmacy and they will forward the refill request to us. Please allow 3 business days for your refill to be completed.          Additional Information About Your Visit        [facility] Information     [facility] [employer] [facility] gives you secure access to your electronic health record. If you see a primary care provider, you can also send messages to your care team and make appointments. If you have questions, please call your primary care clinic.  If you do not have a primary care provider, please call [phone] and they will assist you.        Care EveryWhere ID     This is your Care EveryWhere ID. This could be used by other organizations to access your [place] medical records  [id number]        Your Vitals Were     Pulse Temperature Respirations Height Pulse Oximetry BMI (Body Mass Index)    102 99.1  F (37.3  C) (Tympanic) 14 5' 1.5\" (1.562 m) 99% " 24.54 kg/m2       Blood Pressure from Last 3 Encounters:   11/19/18 136/85   12/14/17 119/80   05/15/17 124/80    Weight from Last 3 Encounters:   11/19/18 132 lb (59.9 kg)   12/14/17 133 lb (60.3 kg)   05/15/17 134 lb (60.8 kg)              We Performed the Following     OPHTHALMOLOGY ADULT REFERRAL        Primary Care Provider Office Phone # Fax #    Vishal Samuels [phone]       [address] [facility] [address]        Equal Access to Services     [facility]: Hadii aad ku hadasho Soomaali, waaxda luqadaha, qaybta kaalmada adescottyyada, griselda kowalski . So [facility] [phone].    ATENCIÓN: Si habla español, tiene a sheikh disposición servicios gratuitos de asistencia lingüística. Llame al [phone].    We comply with applicable federal civil rights laws and [place] laws. We do not discriminate on the basis of race, color, national origin, age, disability, sex, sexual orientation, or gender identity.            Thank you!     Thank you for choosing [facility]  [place] your [place]. Our goal is always to provide you with excellent care. Hearing back from our patients is one way we can continue to improve our services. Please take a few minutes to complete the written survey that you may receive in the mail after your visit with us. Thank you!             Your Updated Medication List - Protect others around you: Learn how to safely use, store and throw away your medicines at www.disposemymeds.org.          This list is accurate as of 11/19/18  2:18 PM.  Always use your most recent med list.                   Brand Name Dispense Instructions for use Diagnosis    aspirin 81 MG tablet      Take 81 mg by mouth daily        MULTIVITAMIN PO      Take by mouth daily        SUPER B COMPLEX PO      Take by mouth daily

## 2018-11-19 NOTE — PROGRESS NOTES

## 2018-11-21 ENCOUNTER — TRANSFERRED RECORDS (OUTPATIENT)
Dept: HEALTH INFORMATION MANAGEMENT | Facility: CLINIC | Age: 48
End: 2018-11-21

## 2018-11-26 DIAGNOSIS — E11.9 DIABETES MELLITUS WITHOUT COMPLICATION (H): ICD-10-CM

## 2018-11-26 DIAGNOSIS — E78.5 HYPERLIPIDEMIA LDL GOAL <100: ICD-10-CM

## 2018-11-26 DIAGNOSIS — E55.9 VITAMIN D DEFICIENCY: ICD-10-CM

## 2018-11-26 LAB
ALBUMIN SERPL-MCNC: 3.6 G/DL (ref 3.4–5)
ALP SERPL-CCNC: 89 U/L (ref 40–150)
ALT SERPL W P-5'-P-CCNC: 68 U/L (ref 0–70)
ANION GAP SERPL CALCULATED.3IONS-SCNC: 6 MMOL/L (ref 3–14)
AST SERPL W P-5'-P-CCNC: 35 U/L (ref 0–45)
BILIRUB SERPL-MCNC: 0.3 MG/DL (ref 0.2–1.3)
BUN SERPL-MCNC: 11 MG/DL (ref 7–30)
CALCIUM SERPL-MCNC: 9 MG/DL (ref 8.5–10.1)
CHLORIDE SERPL-SCNC: 107 MMOL/L (ref 94–109)
CHOLEST SERPL-MCNC: 217 MG/DL
CO2 SERPL-SCNC: 27 MMOL/L (ref 20–32)
CREAT SERPL-MCNC: 0.84 MG/DL (ref 0.66–1.25)
CRP SERPL-MCNC: <2.9 MG/L (ref 0–8)
DEPRECATED CALCIDIOL+CALCIFEROL SERPL-MC: 24 UG/L (ref 20–75)
ERYTHROCYTE [DISTWIDTH] IN BLOOD BY AUTOMATED COUNT: 12.6 % (ref 10–15)
ERYTHROCYTE [SEDIMENTATION RATE] IN BLOOD BY WESTERGREN METHOD: 11 MM/H (ref 0–15)
GFR SERPL CREATININE-BSD FRML MDRD: >90 ML/MIN/1.7M2
GLUCOSE SERPL-MCNC: 101 MG/DL (ref 70–99)
HBA1C MFR BLD: 6.2 % (ref 0–5.6)
HCT VFR BLD AUTO: 44.4 % (ref 40–53)
HDLC SERPL-MCNC: 38 MG/DL
HGB BLD-MCNC: 14.7 G/DL (ref 13.3–17.7)
LDLC SERPL CALC-MCNC: 146 MG/DL
MCH RBC QN AUTO: 27.9 PG (ref 26.5–33)
MCHC RBC AUTO-ENTMCNC: 33.1 G/DL (ref 31.5–36.5)
MCV RBC AUTO: 84 FL (ref 78–100)
NONHDLC SERPL-MCNC: 179 MG/DL
PLATELET # BLD AUTO: 314 10E9/L (ref 150–450)
POTASSIUM SERPL-SCNC: 3.9 MMOL/L (ref 3.4–5.3)
PROT SERPL-MCNC: 7.6 G/DL (ref 6.8–8.8)
RBC # BLD AUTO: 5.26 10E12/L (ref 4.4–5.9)
SODIUM SERPL-SCNC: 140 MMOL/L (ref 133–144)
T4 FREE SERPL-MCNC: 0.86 NG/DL (ref 0.76–1.46)
TRIGL SERPL-MCNC: 165 MG/DL
TSH SERPL DL<=0.005 MIU/L-ACNC: 7.94 MU/L (ref 0.4–4)
VIT B12 SERPL-MCNC: 634 PG/ML (ref 193–986)
WBC # BLD AUTO: 7.4 10E9/L (ref 4–11)

## 2018-11-26 PROCEDURE — 36415 COLL VENOUS BLD VENIPUNCTURE: CPT | Performed by: FAMILY MEDICINE

## 2018-11-26 PROCEDURE — 82607 VITAMIN B-12: CPT | Performed by: FAMILY MEDICINE

## 2018-11-26 PROCEDURE — 84439 ASSAY OF FREE THYROXINE: CPT | Performed by: FAMILY MEDICINE

## 2018-11-26 PROCEDURE — 85652 RBC SED RATE AUTOMATED: CPT | Performed by: FAMILY MEDICINE

## 2018-11-26 PROCEDURE — 83036 HEMOGLOBIN GLYCOSYLATED A1C: CPT | Performed by: FAMILY MEDICINE

## 2018-11-26 PROCEDURE — 84443 ASSAY THYROID STIM HORMONE: CPT | Performed by: FAMILY MEDICINE

## 2018-11-26 PROCEDURE — 85027 COMPLETE CBC AUTOMATED: CPT | Performed by: FAMILY MEDICINE

## 2018-11-26 PROCEDURE — 86140 C-REACTIVE PROTEIN: CPT | Performed by: FAMILY MEDICINE

## 2018-11-26 PROCEDURE — 80053 COMPREHEN METABOLIC PANEL: CPT | Performed by: FAMILY MEDICINE

## 2018-11-26 PROCEDURE — 80061 LIPID PANEL: CPT | Performed by: FAMILY MEDICINE

## 2018-11-26 PROCEDURE — 82306 VITAMIN D 25 HYDROXY: CPT | Performed by: FAMILY MEDICINE

## 2018-12-12 ENCOUNTER — OFFICE VISIT (OUTPATIENT)
Dept: FAMILY MEDICINE | Facility: CLINIC | Age: 48
End: 2018-12-12
Payer: COMMERCIAL

## 2018-12-12 VITALS
HEART RATE: 98 BPM | HEIGHT: 62 IN | DIASTOLIC BLOOD PRESSURE: 91 MMHG | TEMPERATURE: 98 F | BODY MASS INDEX: 23.92 KG/M2 | WEIGHT: 130 LBS | SYSTOLIC BLOOD PRESSURE: 142 MMHG

## 2018-12-12 DIAGNOSIS — Z00.00 HEALTH CARE MAINTENANCE: Primary | ICD-10-CM

## 2018-12-12 DIAGNOSIS — Z13.89 SCREENING FOR DIABETIC PERIPHERAL NEUROPATHY: ICD-10-CM

## 2018-12-12 DIAGNOSIS — Z11.4 SCREENING FOR HIV (HUMAN IMMUNODEFICIENCY VIRUS): ICD-10-CM

## 2018-12-12 PROCEDURE — 99396 PREV VISIT EST AGE 40-64: CPT | Performed by: FAMILY MEDICINE

## 2018-12-12 ASSESSMENT — MIFFLIN-ST. JEOR: SCORE: 1340.99

## 2018-12-12 NOTE — NURSING NOTE
"Chief Complaint   Patient presents with     Physical     Derm Problem       Initial Temp 98  F (36.7  C) (Tympanic)   Ht 1.562 m (5' 1.5\")   Wt 59 kg (130 lb)   BMI 24.17 kg/m   Estimated body mass index is 24.17 kg/m  as calculated from the following:    Height as of this encounter: 1.562 m (5' 1.5\").    Weight as of this encounter: 59 kg (130 lb).  BP completed using cuff size: regular    Health Maintenance Due   Topic Date Due     DTAP/TDAP/TD IMMUNIZATION (1 - Tdap) 08/03/1979     HIV SCREEN (SYSTEM ASSIGNED)  08/03/1990     PNEUMOVAX IMMUNIZATION 19-64 MEDIUM RISK (1 of 1 - PPSV23) 08/03/1991     MICROALBUMIN Q1 YEAR  05/11/2018     FOOT EXAM Q1 YEAR  12/14/2018         Marifer Gonzales MA 12/12/18        "

## 2018-12-12 NOTE — PROGRESS NOTES
SUBJECTIVE:   CC: Tanner Olvera is an 48 year old male who presents for preventative health visit.     Physical   Annual:     Getting at least 3 servings of Calcium per day:  Yes    Bi-annual eye exam:  Yes    Dental care twice a year:  Yes    Sleep apnea or symptoms of sleep apnea:  Excessive snoring    Diet:  Vegetarian/vegan    Frequency of exercise:  2-3 days/week    Duration of exercise:  15-30 minutes    Taking medications regularly:  Yes    Medication side effects:  None    Additional concerns today:  No    PHQ-2 Total Score: 0        Derm Problem: Lump on the right right forearm.    Today's PHQ-2 Score:   PHQ-2 ( 1999 [employer]) 12/10/2018   Q1: Little interest or pleasure in doing things 0   Q2: Feeling down, depressed or hopeless 0   PHQ-2 Score 0   Q1: Little interest or pleasure in doing things Not at all   Q2: Feeling down, depressed or hopeless Not at all   PHQ-2 Score 0       Abuse: Current or Past(Physical, Sexual or Emotional)- No  Do you feel safe in your environment? Yes    Social History     Tobacco Use     Smoking status: Never Smoker     Smokeless tobacco: Never Used   Substance Use Topics     Alcohol use: No     Comment: nothing     Alcohol Use 12/10/2018   If you drink alcohol do you typically have greater than 3 drinks per day OR greater than 7 drinks per week? Not Applicable       Last PSA: No results found for: PSA    Reviewed orders with patient. Reviewed health maintenance and updated orders accordingly - Yes  BP Readings from Last 3 Encounters:   12/12/18 (!) 142/91   11/19/18 136/85   12/14/17 119/80    Wt Readings from Last 3 Encounters:   12/12/18 59 kg (130 lb)   11/19/18 59.9 kg (132 lb)   12/14/17 60.3 kg (133 lb)                  Patient Active Problem List   Diagnosis     Hyperlipidemia LDL goal <100     Family history of early CAD     Degenerative disc disease     Vitamin D deficiency     Abnormal thyroid blood test     Diabetes mellitus without complication (H)     Past  Surgical History:   Procedure Laterality Date     NO HISTORY OF SURGERY         Social History     Tobacco Use     Smoking status: Never Smoker     Smokeless tobacco: Never Used   Substance Use Topics     Alcohol use: No     Comment: nothing     Family History   Problem Relation Age of Onset     C.A.D. Father      Asthma No family hx of      Diabetes No family hx of      Hypertension No family hx of      Cancer - colorectal No family hx of      Prostate Cancer No family hx of      Anesthesia Reaction No family hx of      Blood Disease No family hx of      Eye Disorder No family hx of      Osteoporosis No family hx of      Thyroid Disease No family hx of          Current Outpatient Medications   Medication Sig Dispense Refill     aspirin 81 MG tablet Take 81 mg by mouth daily       B Complex-C (SUPER B COMPLEX PO) Take by mouth daily       Multiple Vitamins-Minerals (MULTIVITAMIN PO) Take by mouth daily       Allergies   Allergen Reactions     Penicillins Rash     Recent Labs   Lab Test 11/26/18  0816 12/15/17  0810 05/11/17  0926 12/23/16  1110   A1C 6.2* 6.3*  --  6.1*   * 121* 141* 144*   HDL 38* 32* 39* 38*   TRIG 165* 157* 127 145   ALT 68 57 52 65   CR 0.84 0.77  --  0.90   GFRESTIMATED >90 >90  --  >90  Non  GFR Calc     GFRESTBLACK >90 >90  --  >90  African American GFR Calc     POTASSIUM 3.9 4.5  --  3.9   TSH 7.94* 4.65*  --  4.66*        Reviewed and updated as needed this visit by clinical staff         Reviewed and updated as needed this visit by Provider        Past Medical History:   Diagnosis Date     Chest pain 12/7/2010     Dandruff 12/7/2010     Degenerative disc disease 1/1/2003     Hyperlipidemia LDL goal <100 9/2/2011     Left hemiparesis (H) 12/22/2016     LFTs abnormal 6/3/2011     (Problem list name updated by automated process. Provider to review and confirm.)     Sensory hearing loss, bilateral 1/11     Skin tags 12/7/2010     Type 2 diabetes, HbA1c goal < 7% (H)  "9/2/2011     Vertigo 12/7/2010      Past Surgical History:   Procedure Laterality Date     NO HISTORY OF SURGERY         Review of Systems  CONSTITUTIONAL: NEGATIVE for fever, chills, change in weight  INTEGUMENTARY/SKIN: NEGATIVE for worrisome rashes, moles or lesions  EYES: NEGATIVE for vision changes or irritation  ENT: NEGATIVE for ear, mouth and throat problems  RESP: NEGATIVE for significant cough or SOB  CV: NEGATIVE for chest pain, palpitations or peripheral edema  GI: NEGATIVE for nausea, abdominal pain, heartburn, or change in bowel habits   male: negative for dysuria, hematuria, decreased urinary stream, erectile dysfunction, urethral discharge  MUSCULOSKELETAL: NEGATIVE for significant arthralgias or myalgia  NEURO: NEGATIVE for weakness, dizziness or paresthesias  PSYCHIATRIC: NEGATIVE for changes in mood or affect    OBJECTIVE:   BP (!) 142/91   Pulse 98   Temp 98  F (36.7  C) (Tympanic)   Ht 1.562 m (5' 1.5\")   Wt 59 kg (130 lb)   BMI 24.17 kg/m      Physical Exam  GENERAL: healthy, alert and no distress  EYES: Eyes grossly normal to inspection, PERRL and conjunctivae and sclerae normal  HENT: ear canals and TM's normal, nose and mouth without ulcers or lesions  NECK: no adenopathy, no asymmetry, masses, or scars and thyroid normal to palpation  RESP: lungs clear to auscultation - no rales, rhonchi or wheezes  CV: regular rate and rhythm, normal S1 S2, no S3 or S4, no murmur, click or rub, no peripheral edema and peripheral pulses strong  ABDOMEN: soft, nontender, no hepatosplenomegaly, no masses and bowel sounds normal   (male): normal male genitalia without lesions or urethral discharge, no hernia  MS: no gross musculoskeletal defects noted, no edema  SKIN: no suspicious lesions or rashes  NEURO: Normal strength and tone, mentation intact and speech normal  BACK: no CVA tenderness, no paralumbar tenderness  PSYCH: mentation appears normal, affect normal/bright  LYMPH: no cervical, " "supraclavicular, axillary, or inguinal adenopathy        ASSESSMENT/PLAN:       ICD-10-CM    1. Health care maintenance Z00.00    2. Screening for HIV (human immunodeficiency virus) Z11.4    3. Screening for diabetic peripheral neuropathy Z13.89 FOOT EXAM  NO CHARGE [47224.114]       COUNSELING:   Reviewed preventive health counseling, as reflected in patient instructions    BP Readings from Last 1 Encounters:   11/19/18 136/85     Estimated body mass index is 24.54 kg/m  as calculated from the following:    Height as of 11/19/18: 1.562 m (5' 1.5\").    Weight as of 11/19/18: 59.9 kg (132 lb).           reports that  has never smoked. he has never used smokeless tobacco.      Counseling Resources:  ATP IV Guidelines  Pooled Cohorts Equation Calculator  FRAX Risk Assessment  ICSI Preventive Guidelines  Dietary Guidelines for Americans, 2010  USDA's MyPlate  ASA Prophylaxis  Lung CA Screening    Vishal Samuels MD  [facility] [place]  "

## 2019-11-06 ENCOUNTER — HEALTH MAINTENANCE LETTER (OUTPATIENT)
Age: 49
End: 2019-11-06

## 2019-12-09 ENCOUNTER — OFFICE VISIT (OUTPATIENT)
Dept: FAMILY MEDICINE | Facility: CLINIC | Age: 49
End: 2019-12-09
Payer: COMMERCIAL

## 2019-12-09 VITALS
BODY MASS INDEX: 25.21 KG/M2 | WEIGHT: 137 LBS | HEART RATE: 105 BPM | DIASTOLIC BLOOD PRESSURE: 74 MMHG | OXYGEN SATURATION: 97 % | HEIGHT: 62 IN | TEMPERATURE: 98.6 F | SYSTOLIC BLOOD PRESSURE: 132 MMHG | RESPIRATION RATE: 12 BRPM

## 2019-12-09 DIAGNOSIS — E11.9 DIABETES MELLITUS WITHOUT COMPLICATION (H): ICD-10-CM

## 2019-12-09 DIAGNOSIS — Z00.00 HEALTHCARE MAINTENANCE: Primary | ICD-10-CM

## 2019-12-09 DIAGNOSIS — R79.89 LOW VITAMIN D LEVEL: ICD-10-CM

## 2019-12-09 DIAGNOSIS — Z23 NEED FOR PROPHYLACTIC VACCINATION AND INOCULATION AGAINST INFLUENZA: ICD-10-CM

## 2019-12-09 DIAGNOSIS — E78.5 HYPERLIPIDEMIA LDL GOAL <100: ICD-10-CM

## 2019-12-09 PROCEDURE — 99396 PREV VISIT EST AGE 40-64: CPT | Mod: 25 | Performed by: FAMILY MEDICINE

## 2019-12-09 PROCEDURE — 90471 IMMUNIZATION ADMIN: CPT | Performed by: FAMILY MEDICINE

## 2019-12-09 PROCEDURE — 90686 IIV4 VACC NO PRSV 0.5 ML IM: CPT | Performed by: FAMILY MEDICINE

## 2019-12-09 ASSESSMENT — MIFFLIN-ST. JEOR: SCORE: 1367.74

## 2019-12-09 NOTE — PROGRESS NOTES
SUBJECTIVE:   CC: Tanner Olvera is an 49 year old male who presents for preventative health visit.     Healthy Habits:     Getting at least 3 servings of Calcium per day:  NO    Bi-annual eye exam:  Yes    Dental care twice a year:  Yes    Sleep apnea or symptoms of sleep apnea:  None    Diet:  Vegetarian/vegan    Frequency of exercise:  1 day/week    Duration of exercise:  Less than 15 minutes    Taking medications regularly:  Not Applicable    Medication side effects:  Not applicable    PHQ-2 Total Score: 0    Additional concerns today:  Yes        Eye Problem       Duration: 1 week     Description (location/character/radiation): discharge, mild itching          Today's PHQ-2 Score:   PHQ-2 ( 1999 Pfizer) 12/9/2019   Q1: Little interest or pleasure in doing things 0   Q2: Feeling down, depressed or hopeless 0   PHQ-2 Score 0   Q1: Little interest or pleasure in doing things Not at all   Q2: Feeling down, depressed or hopeless Not at all   PHQ-2 Score 0       Abuse: Current or Past(Physical, Sexual or Emotional)- No  Do you feel safe in your environment? Yes        Social History     Tobacco Use     Smoking status: Never Smoker     Smokeless tobacco: Never Used   Substance Use Topics     Alcohol use: No     Comment: nothing     If you drink alcohol do you typically have >3 drinks per day or >7 drinks per week? No    Alcohol Use 12/9/2019   Prescreen: >3 drinks/day or >7 drinks/week? Not Applicable   Prescreen: >3 drinks/day or >7 drinks/week? -   No flowsheet data found.    Last PSA: No results found for: PSA    Reviewed orders with patient. Reviewed health maintenance and updated orders accordingly - Yes  BP Readings from Last 3 Encounters:   12/09/19 132/74   12/12/18 (!) 142/91   11/19/18 136/85    Wt Readings from Last 3 Encounters:   12/09/19 62.1 kg (137 lb)   12/12/18 59 kg (130 lb)   11/19/18 59.9 kg (132 lb)                  Patient Active Problem List   Diagnosis     Hyperlipidemia LDL goal <100      Family history of early CAD     Degenerative disc disease     Vitamin D deficiency     Abnormal thyroid blood test     Diabetes mellitus without complication (H)     Past Surgical History:   Procedure Laterality Date     NO HISTORY OF SURGERY         Social History     Tobacco Use     Smoking status: Never Smoker     Smokeless tobacco: Never Used   Substance Use Topics     Alcohol use: No     Comment: nothing     Family History   Problem Relation Age of Onset     C.A.D. Father      Asthma No family hx of      Diabetes No family hx of      Hypertension No family hx of      Cancer - colorectal No family hx of      Prostate Cancer No family hx of      Anesthesia Reaction No family hx of      Blood Disease No family hx of      Eye Disorder No family hx of      Osteoporosis No family hx of      Thyroid Disease No family hx of          No current outpatient medications on file.     Allergies   Allergen Reactions     Penicillins Rash     Recent Labs   Lab Test 11/26/18  0816 12/15/17  0810 05/11/17  0926 12/23/16  1110   A1C 6.2* 6.3*  --  6.1*   * 121* 141* 144*   HDL 38* 32* 39* 38*   TRIG 165* 157* 127 145   ALT 68 57 52 65   CR 0.84 0.77  --  0.90   GFRESTIMATED >90 >90  --  >90  Non  GFR Calc     GFRESTBLACK >90 >90  --  >90  African American GFR Calc     POTASSIUM 3.9 4.5  --  3.9   TSH 7.94* 4.65*  --  4.66*        Reviewed and updated as needed this visit by clinical staff         Reviewed and updated as needed this visit by Provider        Past Medical History:   Diagnosis Date     Chest pain 12/7/2010     Dandruff 12/7/2010     Degenerative disc disease 1/1/2003     Hyperlipidemia LDL goal <100 9/2/2011     Left hemiparesis (H) 12/22/2016     LFTs abnormal 6/3/2011     (Problem list name updated by automated process. Provider to review and confirm.)     Sensory hearing loss, bilateral 1/11     Skin tags 12/7/2010     Type 2 diabetes, HbA1c goal < 7% (H) 9/2/2011     Vertigo 12/7/2010       Past Surgical History:   Procedure Laterality Date     NO HISTORY OF SURGERY         Review of Systems  CONSTITUTIONAL: NEGATIVE for fever, chills, change in weight  INTEGUMENTARY/SKIN: NEGATIVE for worrisome rashes, moles or lesions  EYES: NEGATIVE for vision changes or irritation  ENT: NEGATIVE for ear, mouth and throat problems  RESP: NEGATIVE for significant cough or SOB  CV: NEGATIVE for chest pain, palpitations or peripheral edema  GI: NEGATIVE for nausea, abdominal pain, heartburn, or change in bowel habits   male: negative for dysuria, hematuria, decreased urinary stream, erectile dysfunction, urethral discharge  MUSCULOSKELETAL: NEGATIVE for significant arthralgias or myalgia  NEURO: NEGATIVE for weakness, dizziness or paresthesias  PSYCHIATRIC: NEGATIVE for changes in mood or affect    OBJECTIVE:   There were no vitals taken for this visit.    Physical Exam  GENERAL: healthy, alert and no distress  EYES: Eyes grossly normal to inspection, PERRL and conjunctivae and sclerae normal  HENT: ear canals and TM's normal, nose and mouth without ulcers or lesions  NECK: no adenopathy, no asymmetry, masses, or scars and thyroid normal to palpation  RESP: lungs clear to auscultation - no rales, rhonchi or wheezes  CV: regular rate and rhythm, normal S1 S2, no S3 or S4, no murmur, click or rub, no peripheral edema and peripheral pulses strong  ABDOMEN: soft, nontender, no hepatosplenomegaly, no masses and bowel sounds normal  MS: no gross musculoskeletal defects noted, no edema  SKIN: no suspicious lesions or rashes  NEURO: Normal strength and tone, mentation intact and speech normal  BACK: no CVA tenderness, no paralumbar tenderness  PSYCH: mentation appears normal, affect normal/bright  LYMPH: no cervical, supraclavicular, axillary, or inguinal adenopathy        ASSESSMENT/PLAN:       ICD-10-CM    1. Healthcare maintenance Z00.00 Albumin Random Urine Quantitative with Creat Ratio     HEMOGLOBIN A1C     Lipid  "panel reflex to direct LDL Fasting     CBC with platelets     Vitamin D Deficiency     TSH with free T4 reflex     Comprehensive metabolic panel   2. Need for prophylactic vaccination and inoculation against influenza Z23 INFLUENZA VACCINE IM > 6 MONTHS VALENT IIV4 [79848]     Vaccine Administration, Initial [03315]   3. Low vitamin D level R79.89 Vitamin D Deficiency     TSH with free T4 reflex     Comprehensive metabolic panel   4. Diabetes mellitus without complication (H) E11.9 Albumin Random Urine Quantitative with Creat Ratio     HEMOGLOBIN A1C     Lipid panel reflex to direct LDL Fasting     CBC with platelets     TSH with free T4 reflex     Comprehensive metabolic panel     OPHTHALMOLOGY ADULT REFERRAL   5. Hyperlipidemia LDL goal <100 E78.5 Albumin Random Urine Quantitative with Creat Ratio     HEMOGLOBIN A1C     Lipid panel reflex to direct LDL Fasting     Vitamin D Deficiency     TSH with free T4 reflex     Comprehensive metabolic panel       COUNSELING:   Reviewed preventive health counseling, as reflected in patient instructions    Estimated body mass index is 25.47 kg/m  as calculated from the following:    Height as of this encounter: 1.562 m (5' 1.5\").    Weight as of this encounter: 62.1 kg (137 lb).          reports that he has never smoked. He has never used smokeless tobacco.      Counseling Resources:  ATP IV Guidelines  Pooled Cohorts Equation Calculator  FRAX Risk Assessment  ICSI Preventive Guidelines  Dietary Guidelines for Americans, 2010  USDA's MyPlate  ASA Prophylaxis  Lung CA Screening    Vishal Samuels MD  [facility] [place]  "

## 2019-12-11 DIAGNOSIS — E11.9 DIABETES MELLITUS WITHOUT COMPLICATION (H): ICD-10-CM

## 2019-12-11 DIAGNOSIS — R79.89 LOW VITAMIN D LEVEL: ICD-10-CM

## 2019-12-11 DIAGNOSIS — E78.5 HYPERLIPIDEMIA LDL GOAL <100: ICD-10-CM

## 2019-12-11 DIAGNOSIS — Z00.00 HEALTHCARE MAINTENANCE: ICD-10-CM

## 2019-12-11 DIAGNOSIS — E53.8 VITAMIN B12 DEFICIENCY (NON ANEMIC): ICD-10-CM

## 2019-12-11 LAB
ERYTHROCYTE [DISTWIDTH] IN BLOOD BY AUTOMATED COUNT: 13 % (ref 10–15)
HBA1C MFR BLD: 6.5 % (ref 0–5.6)
HCT VFR BLD AUTO: 44.4 % (ref 40–53)
HGB BLD-MCNC: 14.6 G/DL (ref 13.3–17.7)
MCH RBC QN AUTO: 27.7 PG (ref 26.5–33)
MCHC RBC AUTO-ENTMCNC: 32.9 G/DL (ref 31.5–36.5)
MCV RBC AUTO: 84 FL (ref 78–100)
PLATELET # BLD AUTO: 314 10E9/L (ref 150–450)
RBC # BLD AUTO: 5.28 10E12/L (ref 4.4–5.9)
VIT B12 SERPL-MCNC: 612 PG/ML (ref 193–986)
WBC # BLD AUTO: 8 10E9/L (ref 4–11)

## 2019-12-11 PROCEDURE — 82306 VITAMIN D 25 HYDROXY: CPT | Performed by: FAMILY MEDICINE

## 2019-12-11 PROCEDURE — 84443 ASSAY THYROID STIM HORMONE: CPT | Performed by: FAMILY MEDICINE

## 2019-12-11 PROCEDURE — 82607 VITAMIN B-12: CPT | Performed by: FAMILY MEDICINE

## 2019-12-11 PROCEDURE — 83036 HEMOGLOBIN GLYCOSYLATED A1C: CPT | Performed by: FAMILY MEDICINE

## 2019-12-11 PROCEDURE — 85027 COMPLETE CBC AUTOMATED: CPT | Performed by: FAMILY MEDICINE

## 2019-12-11 PROCEDURE — 84439 ASSAY OF FREE THYROXINE: CPT | Performed by: FAMILY MEDICINE

## 2019-12-11 PROCEDURE — 80061 LIPID PANEL: CPT | Performed by: FAMILY MEDICINE

## 2019-12-11 PROCEDURE — 82043 UR ALBUMIN QUANTITATIVE: CPT | Performed by: FAMILY MEDICINE

## 2019-12-11 PROCEDURE — 80053 COMPREHEN METABOLIC PANEL: CPT | Performed by: FAMILY MEDICINE

## 2019-12-11 PROCEDURE — 36415 COLL VENOUS BLD VENIPUNCTURE: CPT | Performed by: FAMILY MEDICINE

## 2019-12-12 LAB
ALBUMIN SERPL-MCNC: 3.7 G/DL (ref 3.4–5)
ALP SERPL-CCNC: 110 U/L (ref 40–150)
ALT SERPL W P-5'-P-CCNC: 73 U/L (ref 0–70)
ANION GAP SERPL CALCULATED.3IONS-SCNC: 7 MMOL/L (ref 3–14)
AST SERPL W P-5'-P-CCNC: 35 U/L (ref 0–45)
BILIRUB SERPL-MCNC: 0.4 MG/DL (ref 0.2–1.3)
BUN SERPL-MCNC: 9 MG/DL (ref 7–30)
CALCIUM SERPL-MCNC: 8.6 MG/DL (ref 8.5–10.1)
CHLORIDE SERPL-SCNC: 106 MMOL/L (ref 94–109)
CHOLEST SERPL-MCNC: 206 MG/DL
CO2 SERPL-SCNC: 25 MMOL/L (ref 20–32)
CREAT SERPL-MCNC: 0.81 MG/DL (ref 0.66–1.25)
DEPRECATED CALCIDIOL+CALCIFEROL SERPL-MC: 20 UG/L (ref 20–75)
GFR SERPL CREATININE-BSD FRML MDRD: >90 ML/MIN/{1.73_M2}
GLUCOSE SERPL-MCNC: 108 MG/DL (ref 70–99)
HDLC SERPL-MCNC: 36 MG/DL
LDLC SERPL CALC-MCNC: 134 MG/DL
NONHDLC SERPL-MCNC: 170 MG/DL
POTASSIUM SERPL-SCNC: 4.4 MMOL/L (ref 3.4–5.3)
PROT SERPL-MCNC: 7.4 G/DL (ref 6.8–8.8)
SODIUM SERPL-SCNC: 138 MMOL/L (ref 133–144)
T4 FREE SERPL-MCNC: 1.03 NG/DL (ref 0.76–1.46)
TRIGL SERPL-MCNC: 179 MG/DL
TSH SERPL DL<=0.005 MIU/L-ACNC: 4.44 MU/L (ref 0.4–4)

## 2019-12-13 LAB
CREAT UR-MCNC: 301 MG/DL
MICROALBUMIN UR-MCNC: 17 MG/L
MICROALBUMIN/CREAT UR: 5.55 MG/G CR (ref 0–17)

## 2019-12-30 ENCOUNTER — TRANSFERRED RECORDS (OUTPATIENT)
Dept: HEALTH INFORMATION MANAGEMENT | Facility: CLINIC | Age: 49
End: 2019-12-30

## 2019-12-30 LAB — RETINOPATHY: NEGATIVE

## 2020-11-29 ENCOUNTER — HEALTH MAINTENANCE LETTER (OUTPATIENT)
Age: 50
End: 2020-11-29

## 2020-12-23 ENCOUNTER — OFFICE VISIT (OUTPATIENT)
Dept: FAMILY MEDICINE | Facility: CLINIC | Age: 50
End: 2020-12-23
Payer: COMMERCIAL

## 2020-12-23 VITALS
SYSTOLIC BLOOD PRESSURE: 101 MMHG | BODY MASS INDEX: 24.17 KG/M2 | TEMPERATURE: 98.3 F | DIASTOLIC BLOOD PRESSURE: 60 MMHG | HEART RATE: 100 BPM | HEIGHT: 61 IN | OXYGEN SATURATION: 97 % | WEIGHT: 128 LBS

## 2020-12-23 DIAGNOSIS — E78.5 HYPERLIPIDEMIA LDL GOAL <100: ICD-10-CM

## 2020-12-23 DIAGNOSIS — E11.9 DIABETES MELLITUS WITHOUT COMPLICATION (H): ICD-10-CM

## 2020-12-23 DIAGNOSIS — Z23 NEED FOR PROPHYLACTIC VACCINATION AND INOCULATION AGAINST INFLUENZA: ICD-10-CM

## 2020-12-23 DIAGNOSIS — R79.89 LOW VITAMIN D LEVEL: ICD-10-CM

## 2020-12-23 DIAGNOSIS — Z00.00 HEALTH MAINTENANCE EXAMINATION: Primary | ICD-10-CM

## 2020-12-23 DIAGNOSIS — E53.8 VITAMIN B12 DEFICIENCY (NON ANEMIC): ICD-10-CM

## 2020-12-23 DIAGNOSIS — Z23 NEED FOR VACCINATION: ICD-10-CM

## 2020-12-23 LAB
ERYTHROCYTE [DISTWIDTH] IN BLOOD BY AUTOMATED COUNT: 13 % (ref 10–15)
HBA1C MFR BLD: 6.3 % (ref 0–5.6)
HCT VFR BLD AUTO: 45.7 % (ref 40–53)
HGB BLD-MCNC: 15.4 G/DL (ref 13.3–17.7)
MCH RBC QN AUTO: 28.4 PG (ref 26.5–33)
MCHC RBC AUTO-ENTMCNC: 33.7 G/DL (ref 31.5–36.5)
MCV RBC AUTO: 84 FL (ref 78–100)
PLATELET # BLD AUTO: 303 10E9/L (ref 150–450)
RBC # BLD AUTO: 5.43 10E12/L (ref 4.4–5.9)
VIT B12 SERPL-MCNC: 984 PG/ML (ref 193–986)
WBC # BLD AUTO: 8.3 10E9/L (ref 4–11)

## 2020-12-23 PROCEDURE — 83036 HEMOGLOBIN GLYCOSYLATED A1C: CPT | Performed by: FAMILY MEDICINE

## 2020-12-23 PROCEDURE — 80061 LIPID PANEL: CPT | Performed by: FAMILY MEDICINE

## 2020-12-23 PROCEDURE — 82043 UR ALBUMIN QUANTITATIVE: CPT | Performed by: FAMILY MEDICINE

## 2020-12-23 PROCEDURE — 80053 COMPREHEN METABOLIC PANEL: CPT | Performed by: FAMILY MEDICINE

## 2020-12-23 PROCEDURE — 90472 IMMUNIZATION ADMIN EACH ADD: CPT | Performed by: FAMILY MEDICINE

## 2020-12-23 PROCEDURE — 85027 COMPLETE CBC AUTOMATED: CPT | Performed by: FAMILY MEDICINE

## 2020-12-23 PROCEDURE — 82306 VITAMIN D 25 HYDROXY: CPT | Performed by: FAMILY MEDICINE

## 2020-12-23 PROCEDURE — 84443 ASSAY THYROID STIM HORMONE: CPT | Performed by: FAMILY MEDICINE

## 2020-12-23 PROCEDURE — 84439 ASSAY OF FREE THYROXINE: CPT | Performed by: FAMILY MEDICINE

## 2020-12-23 PROCEDURE — 90471 IMMUNIZATION ADMIN: CPT | Performed by: FAMILY MEDICINE

## 2020-12-23 PROCEDURE — 90715 TDAP VACCINE 7 YRS/> IM: CPT | Performed by: FAMILY MEDICINE

## 2020-12-23 PROCEDURE — 82607 VITAMIN B-12: CPT | Performed by: FAMILY MEDICINE

## 2020-12-23 PROCEDURE — 90686 IIV4 VACC NO PRSV 0.5 ML IM: CPT | Performed by: FAMILY MEDICINE

## 2020-12-23 PROCEDURE — 99207 PR FOOT EXAM NO CHARGE: CPT | Mod: 25 | Performed by: FAMILY MEDICINE

## 2020-12-23 PROCEDURE — 99396 PREV VISIT EST AGE 40-64: CPT | Mod: 25 | Performed by: FAMILY MEDICINE

## 2020-12-23 PROCEDURE — 36415 COLL VENOUS BLD VENIPUNCTURE: CPT | Performed by: FAMILY MEDICINE

## 2020-12-23 RX ORDER — MULTIVIT-MIN/IRON/FOLIC ACID/K 18-600-40
2000 CAPSULE ORAL
COMMUNITY

## 2020-12-23 ASSESSMENT — MIFFLIN-ST. JEOR: SCORE: 1313.98

## 2020-12-23 NOTE — PROGRESS NOTES
Answers for HPI/ROS submitted by the patient on 12/16/2020   Annual Exam:  Frequency of exercise:: 1 day/week  Getting at least 3 servings of Calcium per day:: Yes  Diet:: Vegetarian/vegan  Taking medications regularly:: Not Applicable  Medication side effects:: Not applicable  Bi-annual eye exam:: Yes  Dental care twice a year:: Yes  Sleep apnea or symptoms of sleep apnea:: Excessive snoring  Additional concerns today:: Yes  Duration of exercise:: Less than 15 minutes  Answers for HPI/ROS submitted by the patient on 12/16/2020   Annual Exam:  Frequency of exercise:: 1 day/week  Getting at least 3 servings of Calcium per day:: Yes  Diet:: Vegetarian/vegan  Taking medications regularly:: Not Applicable  Medication side effects:: Not applicable  Bi-annual eye exam:: Yes  Dental care twice a year:: Yes  Sleep apnea or symptoms of sleep apnea:: Excessive snoring  Additional concerns today:: Yes  Duration of exercise:: Less than 15 minutes

## 2020-12-23 NOTE — PROGRESS NOTES
SUBJECTIVE:   CC: Tanner Olvera is an 50 year old male who presents for preventative health visit.       Patient has been advised of split billing requirements and indicates understanding: Yes  Healthy Habits:     Getting at least 3 servings of Calcium per day:  Yes    Bi-annual eye exam:  Yes    Dental care twice a year:  Yes    Sleep apnea or symptoms of sleep apnea:  Excessive snoring    Diet:  Vegetarian/vegan    Frequency of exercise:  1 day/week    Duration of exercise:  Less than 15 minutes    Taking medications regularly:  Not Applicable    Medication side effects:  Not applicable    PHQ-2 Total Score: 0    Additional concerns today:  Yes        Today's PHQ-2 Score:   PHQ-2 ( 1999 Pfizer) 12/16/2020   Q1: Little interest or pleasure in doing things 0   Q2: Feeling down, depressed or hopeless 0   PHQ-2 Score 0   Q1: Little interest or pleasure in doing things Not at all   Q2: Feeling down, depressed or hopeless Not at all   PHQ-2 Score 0       Abuse: Current or Past(Physical, Sexual or Emotional)- No  Do you feel safe in your environment? Yes        Social History     Tobacco Use     Smoking status: Never Smoker     Smokeless tobacco: Never Used   Substance Use Topics     Alcohol use: No     Comment: nothing     If you drink alcohol do you typically have >3 drinks per day or >7 drinks per week? No    Alcohol Use 12/16/2020   Prescreen: >3 drinks/day or >7 drinks/week? Not Applicable   Prescreen: >3 drinks/day or >7 drinks/week? -   No flowsheet data found.    Last PSA: No results found for: PSA    Reviewed orders with patient. Reviewed health maintenance and updated orders accordingly - Yes  BP Readings from Last 3 Encounters:   12/23/20 101/60   12/09/19 132/74   12/12/18 (!) 142/91    Wt Readings from Last 3 Encounters:   12/23/20 58.1 kg (128 lb)   12/09/19 62.1 kg (137 lb)   12/12/18 59 kg (130 lb)                  Patient Active Problem List   Diagnosis     Hyperlipidemia LDL goal <100     Family  history of early CAD     Degenerative disc disease     Vitamin D deficiency     Abnormal thyroid blood test     Diabetes mellitus without complication (H)     Past Surgical History:   Procedure Laterality Date     NO HISTORY OF SURGERY         Social History     Tobacco Use     Smoking status: Never Smoker     Smokeless tobacco: Never Used   Substance Use Topics     Alcohol use: No     Comment: nothing     Family History   Problem Relation Age of Onset     C.A.D. Father      Asthma No family hx of      Diabetes No family hx of      Hypertension No family hx of      Cancer - colorectal No family hx of      Prostate Cancer No family hx of      Anesthesia Reaction No family hx of      Blood Disease No family hx of      Eye Disorder No family hx of      Osteoporosis No family hx of      Thyroid Disease No family hx of          Current Outpatient Medications   Medication Sig Dispense Refill     Multiple Vitamins-Minerals (MULTIVITAMIN ADULTS PO)        Vitamin D, Cholecalciferol, 25 MCG (1000 UT) TABS Take 2,000 Units by mouth       Allergies   Allergen Reactions     Penicillins Rash     Recent Labs   Lab Test 12/11/19  0956 11/26/18  0816 12/15/17  0810   A1C 6.5* 6.2* 6.3*   * 146* 121*   HDL 36* 38* 32*   TRIG 179* 165* 157*   ALT 73* 68 57   CR 0.81 0.84 0.77   GFRESTIMATED >90 >90 >90   GFRESTBLACK >90 >90 >90   POTASSIUM 4.4 3.9 4.5   TSH 4.44* 7.94* 4.65*        Reviewed and updated as needed this visit by clinical staff   Allergies  Meds              Reviewed and updated as needed this visit by Provider                Past Medical History:   Diagnosis Date     Chest pain 12/7/2010     Dandruff 12/7/2010     Degenerative disc disease 1/1/2003     Hyperlipidemia LDL goal <100 9/2/2011     Left hemiparesis (H) 12/22/2016     LFTs abnormal 6/3/2011     (Problem list name updated by automated process. Provider to review and confirm.)     Sensory hearing loss, bilateral 1/11     Skin tags 12/7/2010     Type 2  "diabetes, HbA1c goal < 7% (H) 9/2/2011     Vertigo 12/7/2010      Past Surgical History:   Procedure Laterality Date     NO HISTORY OF SURGERY         Review of Systems  CONSTITUTIONAL: NEGATIVE for fever, chills, change in weight  INTEGUMENTARY/SKIN: NEGATIVE for worrisome rashes, moles or lesions  EYES: NEGATIVE for vision changes or irritation  ENT: NEGATIVE for ear, mouth and throat problems  RESP: NEGATIVE for significant cough or SOB  CV: NEGATIVE for chest pain, palpitations or peripheral edema  GI: NEGATIVE for nausea, abdominal pain, heartburn, or change in bowel habits   male: negative for dysuria, hematuria, decreased urinary stream, erectile dysfunction, urethral discharge  MUSCULOSKELETAL: NEGATIVE for significant arthralgias or myalgia  NEURO: NEGATIVE for weakness, dizziness or paresthesias  PSYCHIATRIC: NEGATIVE for changes in mood or affect    OBJECTIVE:   /60   Pulse 100   Temp 98.3  F (36.8  C) (Tympanic)   Ht 1.549 m (5' 1\")   Wt 58.1 kg (128 lb)   SpO2 97%   BMI 24.19 kg/m      Physical Exam  GENERAL: healthy, alert and no distress  NECK: no adenopathy, no asymmetry, masses, or scars and thyroid normal to palpation  RESP: lungs clear to auscultation - no rales, rhonchi or wheezes  CV: regular rate and rhythm, normal S1 S2, no S3 or S4, no murmur, click or rub, no peripheral edema and peripheral pulses strong  ABDOMEN: soft, nontender, no hepatosplenomegaly, no masses and bowel sounds normal  MS: no gross musculoskeletal defects noted, no edema    Diagnostic Test Results:  Labs reviewed in Epic    ASSESSMENT/PLAN:       ICD-10-CM    1. Health maintenance examination  Z00.00 CBC with platelets     Comprehensive metabolic panel (BMP + Alb, Alk Phos, ALT, AST, Total. Bili, TP)     Lipid panel reflex to direct LDL Fasting     Hemoglobin A1c     Albumin Random Urine Quantitative with Creat Ratio     FOOT EXAM     TSH with free T4 reflex     Vitamin B12   2. Need for prophylactic " "vaccination and inoculation against influenza  Z23 INFLUENZA VACCINE IM > 6 MONTHS VALENT IIV4 [32211]   3. Diabetes mellitus without complication (H)  E11.9 Hemoglobin A1c     Albumin Random Urine Quantitative with Creat Ratio     FOOT EXAM   4. Hyperlipidemia LDL goal <100  E78.5 Comprehensive metabolic panel (BMP + Alb, Alk Phos, ALT, AST, Total. Bili, TP)     Lipid panel reflex to direct LDL Fasting     Hemoglobin A1c     Albumin Random Urine Quantitative with Creat Ratio   5. Low vitamin D level  R79.89 Vitamin D Deficiency   6. Vitamin B12 deficiency (non anemic)  E53.8 TSH with free T4 reflex     Vitamin B12       Patient has been advised of split billing requirements and indicates understanding: Yes  COUNSELING:   Reviewed preventive health counseling, as reflected in patient instructions    Estimated body mass index is 25.47 kg/m  as calculated from the following:    Height as of 12/9/19: 1.562 m (5' 1.5\").    Weight as of 12/9/19: 62.1 kg (137 lb).         He reports that he has never smoked. He has never used smokeless tobacco.      Counseling Resources:  ATP IV Guidelines  Pooled Cohorts Equation Calculator  FRAX Risk Assessment  ICSI Preventive Guidelines  Dietary Guidelines for Americans, 2010  USDA's MyPlate  ASA Prophylaxis  Lung CA Screening    Vishal Samuels MD  [facility]  "

## 2020-12-24 LAB
ALBUMIN SERPL-MCNC: 4.2 G/DL (ref 3.4–5)
ALP SERPL-CCNC: 105 U/L (ref 40–150)
ALT SERPL W P-5'-P-CCNC: 85 U/L (ref 0–70)
ANION GAP SERPL CALCULATED.3IONS-SCNC: 5 MMOL/L (ref 3–14)
AST SERPL W P-5'-P-CCNC: 35 U/L (ref 0–45)
BILIRUB SERPL-MCNC: 0.4 MG/DL (ref 0.2–1.3)
BUN SERPL-MCNC: 11 MG/DL (ref 7–30)
CALCIUM SERPL-MCNC: 9.2 MG/DL (ref 8.5–10.1)
CHLORIDE SERPL-SCNC: 106 MMOL/L (ref 94–109)
CHOLEST SERPL-MCNC: 222 MG/DL
CO2 SERPL-SCNC: 27 MMOL/L (ref 20–32)
CREAT SERPL-MCNC: 0.84 MG/DL (ref 0.66–1.25)
GFR SERPL CREATININE-BSD FRML MDRD: >90 ML/MIN/{1.73_M2}
GLUCOSE SERPL-MCNC: 90 MG/DL (ref 70–99)
HDLC SERPL-MCNC: 41 MG/DL
LDLC SERPL CALC-MCNC: 151 MG/DL
NONHDLC SERPL-MCNC: 181 MG/DL
POTASSIUM SERPL-SCNC: 4.3 MMOL/L (ref 3.4–5.3)
PROT SERPL-MCNC: 8 G/DL (ref 6.8–8.8)
SODIUM SERPL-SCNC: 138 MMOL/L (ref 133–144)
T4 FREE SERPL-MCNC: 1.04 NG/DL (ref 0.76–1.46)
TRIGL SERPL-MCNC: 149 MG/DL
TSH SERPL DL<=0.005 MIU/L-ACNC: 5.39 MU/L (ref 0.4–4)

## 2020-12-25 LAB
CREAT UR-MCNC: 439 MG/DL
MICROALBUMIN UR-MCNC: 78 MG/L
MICROALBUMIN/CREAT UR: 17.88 MG/G CR (ref 0–17)

## 2020-12-27 LAB — DEPRECATED CALCIDIOL+CALCIFEROL SERPL-MC: 67 UG/L (ref 20–75)

## 2021-04-06 ENCOUNTER — IMMUNIZATION (OUTPATIENT)
Dept: NURSING | Facility: CLINIC | Age: 51
End: 2021-04-06
Payer: COMMERCIAL

## 2021-04-06 PROCEDURE — 91300 PR COVID VAC PFIZER DIL RECON 30 MCG/0.3 ML IM: CPT

## 2021-04-06 PROCEDURE — 0001A PR COVID VAC PFIZER DIL RECON 30 MCG/0.3 ML IM: CPT

## 2021-04-27 ENCOUNTER — IMMUNIZATION (OUTPATIENT)
Dept: NURSING | Facility: CLINIC | Age: 51
End: 2021-04-27
Attending: FAMILY MEDICINE
Payer: COMMERCIAL

## 2021-04-27 PROCEDURE — 0002A PR COVID VAC PFIZER DIL RECON 30 MCG/0.3 ML IM: CPT

## 2021-04-27 PROCEDURE — 91300 PR COVID VAC PFIZER DIL RECON 30 MCG/0.3 ML IM: CPT

## 2021-07-25 ENCOUNTER — HEALTH MAINTENANCE LETTER (OUTPATIENT)
Age: 51
End: 2021-07-25

## 2021-11-01 ENCOUNTER — OFFICE VISIT (OUTPATIENT)
Dept: FAMILY MEDICINE | Facility: CLINIC | Age: 51
End: 2021-11-01
Payer: COMMERCIAL

## 2021-11-01 VITALS
HEART RATE: 95 BPM | TEMPERATURE: 97.6 F | SYSTOLIC BLOOD PRESSURE: 126 MMHG | WEIGHT: 130 LBS | OXYGEN SATURATION: 97 % | DIASTOLIC BLOOD PRESSURE: 70 MMHG | HEIGHT: 61 IN | RESPIRATION RATE: 16 BRPM | BODY MASS INDEX: 24.55 KG/M2

## 2021-11-01 DIAGNOSIS — H01.001 BLEPHARITIS OF RIGHT UPPER EYELID, UNSPECIFIED TYPE: Primary | ICD-10-CM

## 2021-11-01 DIAGNOSIS — E11.9 DIABETES MELLITUS WITHOUT COMPLICATION (H): ICD-10-CM

## 2021-11-01 DIAGNOSIS — Z23 NEED FOR PROPHYLACTIC VACCINATION AND INOCULATION AGAINST INFLUENZA: ICD-10-CM

## 2021-11-01 LAB — HBA1C MFR BLD: 6.2 % (ref 0–5.6)

## 2021-11-01 PROCEDURE — 99214 OFFICE O/P EST MOD 30 MIN: CPT | Mod: 25 | Performed by: FAMILY MEDICINE

## 2021-11-01 PROCEDURE — 36415 COLL VENOUS BLD VENIPUNCTURE: CPT | Performed by: FAMILY MEDICINE

## 2021-11-01 PROCEDURE — 90471 IMMUNIZATION ADMIN: CPT | Performed by: FAMILY MEDICINE

## 2021-11-01 PROCEDURE — 90686 IIV4 VACC NO PRSV 0.5 ML IM: CPT | Performed by: FAMILY MEDICINE

## 2021-11-01 PROCEDURE — 80053 COMPREHEN METABOLIC PANEL: CPT | Performed by: FAMILY MEDICINE

## 2021-11-01 PROCEDURE — 83036 HEMOGLOBIN GLYCOSYLATED A1C: CPT | Performed by: FAMILY MEDICINE

## 2021-11-01 RX ORDER — DOXYCYCLINE 100 MG/1
100 CAPSULE ORAL 2 TIMES DAILY
Qty: 14 CAPSULE | Refills: 0 | Status: SHIPPED | OUTPATIENT
Start: 2021-11-01 | End: 2021-11-08

## 2021-11-01 ASSESSMENT — MIFFLIN-ST. JEOR: SCORE: 1318.06

## 2021-11-01 ASSESSMENT — PAIN SCALES - GENERAL: PAINLEVEL: MODERATE PAIN (4)

## 2021-11-01 NOTE — PATIENT INSTRUCTIONS
Check labs  Take medications as directed.  Cares and symptomatic cares discussed   Follow up if problem or concern     Patient Education     Blepharitis    Blepharitis is an inflammation of the eyelid. It results in swelling of the eyelids, and it is often caused by a bacterial infection or a skin condition. Blepharitis is a common eye condition. There are two types. Anterior blepharitis occurs where the eyelashes are attached (outside front edge of the eyelid). Posterior blepharitis affects the inner edge of the eyelid that touches the eyeball.  In addition to swollen eyelids, blepharitis symptoms can include thick, yellow, dandruff-like scales that stick to the eyelid. There may be oily patches on the eyelid. The eyelashes may be crusted (with dandruff-like scales) when you wake up from sleeping. The irritated area may itch. The eyelids may be red. The eyes can be red and burn or sting. The eyes may tear a lot, or be dry. You can become sensitive to light or have blurred vision. Symptoms of blepharitis can cause irritability.  Blepharitis is a chronic condition and hard to cure. Even with successful treatment, recurrences are common. Good hygiene and home treatments (in the Home care section below) can improve your condition.  Causes  Causes of blepharitis may include:    Problems with the oil glands in the eyelid (meibomian glands)    Dandruff of the scalp and eyebrows (seborrheic dermatitis)    Acne rosacea (a skin condition that causes redness of the face, and other symptoms)    Eyelash mites (tiny organisms in the eyelash follicles)    Allergic reactions to cosmetics or medicines  Home care  Medicine: The healthcare provider may prescribe an antibiotic eye drops or ointment, artificial tears, and/or steroid eye drops. Follow all instructions for using these medicines. Use all medicines as directed. If you have pain, take medicine as advised by the healthcare provider.    Wash your hands carefully with soap and  warm water before and after caring for your eyes.    Apply a warm compress or a warm, moist washcloth to the eyelids for 1 minute, 2 to 3 times a day, to loosen the crust. Then, wipe away scales or crust from the eyelids.    After applying the warm compress, gently scrub the base of the eyelashes for almost 15 seconds per eyelid. To do this, close your eyes and use a moist eyelid cleansing wipe, clean washcloth, or cotton swab. Ask your healthcare provider about products (such as gentle baby shampoo) to use to help clean the eyelids.    You may be instructed to gently massage your eyelids to help unblock the eyelid glands. Follow all instructions given by the healthcare provider.    Unless told otherwise, on a regular basis, with eyes closed, clean your eyelids as directed by the healthcare provider. Blepharitis can be an ongoing problem.    Don't wear eye makeup until the inflammation goes away, or as directed by your healthcare provider.    Unless told otherwise, stop using contact lenses until you complete treatment for the condition.    Wash your hands regularly to help prevent dirt and bacteria from coming in contact with your eyelid.  Follow-up care  Follow up with your healthcare provider, or as advised. Your healthcare provider may refer you to an eye specialist (an optometrist or ophthalmologist) for further evaluation and treatment.  When to seek medical advice  Call your healthcare provider right away if any of these occur:    Increase in redness of the white part of the eye    Increase in swelling, redness, irritation, or pain of the eyelids    Eye pain    Change in vision (trouble seeing or blurring)    Drainage (pus, blood) from the eyelid    Fever of 100.4 F (38 C) or higher, or as directed by your healthcare provider  Yan last reviewed this educational content on 3/1/2018    4258-0992 [employer]. All rights reserved. This information is not intended as a substitute for  professional medical care. Always follow your healthcare professional's instructions.

## 2021-11-01 NOTE — PROGRESS NOTES
Assessment & Plan     (H01.001) Blepharitis of right upper eyelid, unspecified type  (primary encounter diagnosis)  Comment:   Plan: doxycycline hyclate (VIBRAMYCIN) 100 MG capsule            (Z23) Need for prophylactic vaccination and inoculation against influenza  Comment:   Plan: INFLUENZA VACCINE IM > 6 MONTHS VALENT IIV4         (AFLURIA/FLUZONE)            (E11.9) Diabetes mellitus without complication (H)  Comment: he is mostly diet control   Plan: Hemoglobin A1c, Comprehensive metabolic panel            Check labs. Script sent.Cares and  treatment discussed. follow up if problem   Patient expressed understanding and agreement with treatment plan. All patient's questions were answered, will let me know if has more later.  Medications: Rx's: Reviewed the potential side effects/complications of medications prescribed.       Amber Mata MD  [facility]   Tanner is a 51 year old who presents for the following health issues     HPI     Eye(s) lid Problem  Onset/Duration: x 3-4 days   Description:   Location: right upper eye lid . Had small bump looked better initially but feels like whole lid margin is swollen, feels worse at night . Slight drainage , inside eye  is ok. vision is ok   Pain: YES- soreness, itching   Redness: YES  Accompanying Signs & Symptoms:   Discharge/mattering: YES  Swelling: YES  Visual changes: no  Fever: no  History:  Trauma: no  Foreign body exposure: no  Wearing contacts: no  Precipitating or alleviating factors: None  Therapies tried and outcome: None    ADD ON PROBLEM     Has hx of DM, pat due for labs, so willing to proceed with labs today. He is mostly diet control and doing well. He plans to schedule a follow up with PCP soon as he will be due for yearly physical but wants to do some non fasting labs today     Review of Systems   Constitutional, HEENT, cardiovascular, pulmonary, GI, , musculoskeletal, neuro, skin, endocrine and  "psych systems are negative, except as otherwise noted.      Objective    /70   Pulse 95   Temp 97.6  F (36.4  C) (Tympanic)   Resp 16   Ht 1.549 m (5' 1\")   Wt 59 kg (130 lb)   SpO2 97%   BMI 24.56 kg/m    Body mass index is 24.56 kg/m .  Physical Exam   GENERAL: healthy, alert and no distress  EYES: Eyes grossly normal to inspection except  rt upper eye lid looks swollen and slightly erythematous   PERRL, EOMI, conjunctivae and sclerae normal    HENT: oropharynx clear and oral mucous membranes moist  NECK: no adenopathy,   RESP: lungs clear to auscultation - no rales, rhonchi or wheezes  CV: regular rate and rhythm, normal S1 S2,   NEURO:  Grossly normal.         "

## 2021-11-02 LAB
ALBUMIN SERPL-MCNC: 4.2 G/DL (ref 3.4–5)
ALP SERPL-CCNC: 96 U/L (ref 40–150)
ALT SERPL W P-5'-P-CCNC: 69 U/L (ref 0–70)
ANION GAP SERPL CALCULATED.3IONS-SCNC: 5 MMOL/L (ref 3–14)
AST SERPL W P-5'-P-CCNC: 26 U/L (ref 0–45)
BILIRUB SERPL-MCNC: 0.2 MG/DL (ref 0.2–1.3)
BUN SERPL-MCNC: 8 MG/DL (ref 7–30)
CALCIUM SERPL-MCNC: 9 MG/DL (ref 8.5–10.1)
CHLORIDE BLD-SCNC: 104 MMOL/L (ref 94–109)
CO2 SERPL-SCNC: 27 MMOL/L (ref 20–32)
CREAT SERPL-MCNC: 0.81 MG/DL (ref 0.66–1.25)
GFR SERPL CREATININE-BSD FRML MDRD: >90 ML/MIN/1.73M2
GLUCOSE BLD-MCNC: 165 MG/DL (ref 70–99)
POTASSIUM BLD-SCNC: 4.1 MMOL/L (ref 3.4–5.3)
PROT SERPL-MCNC: 7.4 G/DL (ref 6.8–8.8)
SODIUM SERPL-SCNC: 136 MMOL/L (ref 133–144)

## 2021-12-09 ENCOUNTER — OFFICE VISIT (OUTPATIENT)
Dept: FAMILY MEDICINE | Facility: CLINIC | Age: 51
End: 2021-12-09
Payer: COMMERCIAL

## 2021-12-09 VITALS
DIASTOLIC BLOOD PRESSURE: 78 MMHG | RESPIRATION RATE: 14 BRPM | TEMPERATURE: 97.8 F | SYSTOLIC BLOOD PRESSURE: 124 MMHG | WEIGHT: 128 LBS | HEIGHT: 61 IN | HEART RATE: 77 BPM | OXYGEN SATURATION: 100 % | BODY MASS INDEX: 24.17 KG/M2

## 2021-12-09 DIAGNOSIS — E11.9 DIABETES MELLITUS WITHOUT COMPLICATION (H): ICD-10-CM

## 2021-12-09 DIAGNOSIS — M75.52 BURSITIS OF LEFT SHOULDER: ICD-10-CM

## 2021-12-09 DIAGNOSIS — Z13.220 SCREENING FOR HYPERLIPIDEMIA: ICD-10-CM

## 2021-12-09 DIAGNOSIS — R79.89 LOW VITAMIN D LEVEL: ICD-10-CM

## 2021-12-09 DIAGNOSIS — M75.02 ADHESIVE CAPSULITIS OF LEFT SHOULDER: ICD-10-CM

## 2021-12-09 DIAGNOSIS — E53.8 VITAMIN B12 DEFICIENCY (NON ANEMIC): ICD-10-CM

## 2021-12-09 DIAGNOSIS — S46.002A INJURY OF MUSCLE OR TENDON OF LEFT ROTATOR CUFF: ICD-10-CM

## 2021-12-09 DIAGNOSIS — E03.8 SUBCLINICAL HYPOTHYROIDISM: ICD-10-CM

## 2021-12-09 DIAGNOSIS — Z00.00 HEALTHCARE MAINTENANCE: Primary | ICD-10-CM

## 2021-12-09 DIAGNOSIS — E78.5 HYPERLIPIDEMIA LDL GOAL <100: ICD-10-CM

## 2021-12-09 DIAGNOSIS — Z23 HIGH PRIORITY FOR 2019-NCOV VACCINE: ICD-10-CM

## 2021-12-09 LAB
ERYTHROCYTE [DISTWIDTH] IN BLOOD BY AUTOMATED COUNT: 13.1 % (ref 10–15)
HBA1C MFR BLD: 6.4 % (ref 0–5.6)
HCT VFR BLD AUTO: 45.7 % (ref 40–53)
HGB BLD-MCNC: 15 G/DL (ref 13.3–17.7)
MCH RBC QN AUTO: 27.8 PG (ref 26.5–33)
MCHC RBC AUTO-ENTMCNC: 32.8 G/DL (ref 31.5–36.5)
MCV RBC AUTO: 85 FL (ref 78–100)
PLATELET # BLD AUTO: 328 10E3/UL (ref 150–450)
RBC # BLD AUTO: 5.39 10E6/UL (ref 4.4–5.9)
VIT B12 SERPL-MCNC: 895 PG/ML (ref 193–986)
WBC # BLD AUTO: 8.1 10E3/UL (ref 4–11)

## 2021-12-09 PROCEDURE — 99213 OFFICE O/P EST LOW 20 MIN: CPT | Mod: 25 | Performed by: FAMILY MEDICINE

## 2021-12-09 PROCEDURE — 82306 VITAMIN D 25 HYDROXY: CPT | Performed by: FAMILY MEDICINE

## 2021-12-09 PROCEDURE — 82607 VITAMIN B-12: CPT | Performed by: FAMILY MEDICINE

## 2021-12-09 PROCEDURE — 84443 ASSAY THYROID STIM HORMONE: CPT | Performed by: FAMILY MEDICINE

## 2021-12-09 PROCEDURE — 99396 PREV VISIT EST AGE 40-64: CPT | Performed by: FAMILY MEDICINE

## 2021-12-09 PROCEDURE — 82043 UR ALBUMIN QUANTITATIVE: CPT | Performed by: FAMILY MEDICINE

## 2021-12-09 PROCEDURE — 80053 COMPREHEN METABOLIC PANEL: CPT | Performed by: FAMILY MEDICINE

## 2021-12-09 PROCEDURE — 0004A COVID-19,PF,PFIZER (12+ YRS): CPT | Performed by: FAMILY MEDICINE

## 2021-12-09 PROCEDURE — 36415 COLL VENOUS BLD VENIPUNCTURE: CPT | Performed by: FAMILY MEDICINE

## 2021-12-09 PROCEDURE — 80061 LIPID PANEL: CPT | Performed by: FAMILY MEDICINE

## 2021-12-09 PROCEDURE — 91300 COVID-19,PF,PFIZER (12+ YRS): CPT | Performed by: FAMILY MEDICINE

## 2021-12-09 PROCEDURE — 83036 HEMOGLOBIN GLYCOSYLATED A1C: CPT | Performed by: FAMILY MEDICINE

## 2021-12-09 PROCEDURE — 85027 COMPLETE CBC AUTOMATED: CPT | Performed by: FAMILY MEDICINE

## 2021-12-09 ASSESSMENT — ENCOUNTER SYMPTOMS
JOINT SWELLING: 0
DIZZINESS: 0
PARESTHESIAS: 0
HEARTBURN: 0
MYALGIAS: 0
CHILLS: 0
CONSTIPATION: 0
ABDOMINAL PAIN: 0
FREQUENCY: 0
DIARRHEA: 0
SHORTNESS OF BREATH: 0
COUGH: 0
WEAKNESS: 0
SORE THROAT: 0
FEVER: 0
HEMATURIA: 0
EYE PAIN: 0
HEADACHES: 0
NAUSEA: 0
DYSURIA: 0
PALPITATIONS: 0
ARTHRALGIAS: 1
NERVOUS/ANXIOUS: 0
HEMATOCHEZIA: 0

## 2021-12-09 ASSESSMENT — MIFFLIN-ST. JEOR: SCORE: 1301.04

## 2021-12-09 ASSESSMENT — PAIN SCALES - GENERAL: PAINLEVEL: NO PAIN (0)

## 2021-12-09 NOTE — PROGRESS NOTES
SUBJECTIVE:   CC: Tanner Olvera is an 51 year old male who presents for preventative health visit.       Patient has been advised of split billing requirements and indicates understanding: Yes  Healthy Habits:     Getting at least 3 servings of Calcium per day:  Yes    Bi-annual eye exam:  Yes    Dental care twice a year:  Yes    Sleep apnea or symptoms of sleep apnea:  Excessive snoring    Diet:  Regular (no restrictions), Vegetarian/vegan and Breakfast skipped    Frequency of exercise:  None    Taking medications regularly:  Not Applicable    Medication side effects:  Not applicable    PHQ-2 Total Score: 0    Additional concerns today:  Yes    Patient would like to discuss booster shot for Pfizer.   - 04/27/21        Today's PHQ-2 Score:   PHQ-2 ( 1999 Pfizer) 12/9/2021   Q1: Little interest or pleasure in doing things 0   Q2: Feeling down, depressed or hopeless 0   PHQ-2 Score 0   PHQ-2 Total Score (12-17 Years)- Positive if 3 or more points; Administer PHQ-A if positive -   Q1: Little interest or pleasure in doing things Not at all   Q2: Feeling down, depressed or hopeless Not at all   PHQ-2 Score 0       Abuse: Current or Past(Physical, Sexual or Emotional)- No  Do you feel safe in your environment? Yes    Have you ever done Advance Care Planning? (For example, a Health Directive, POLST, or a discussion with a medical provider or your loved ones about your wishes): No, advance care planning information given to patient to review.  Patient declined advance care planning discussion at this time.    Social History     Tobacco Use     Smoking status: Never Smoker     Smokeless tobacco: Never Used     Tobacco comment: Non Smoker   Substance Use Topics     Alcohol use: No     Comment: nothing     If you drink alcohol do you typically have >3 drinks per day or >7 drinks per week? No    Alcohol Use 12/9/2021   Prescreen: >3 drinks/day or >7 drinks/week? Not Applicable   Prescreen: >3 drinks/day or >7 drinks/week?  -   No flowsheet data found.    Last PSA: No results found for: PSA    Reviewed orders with patient. Reviewed health maintenance and updated orders accordingly - Yes  BP Readings from Last 3 Encounters:   12/09/21 124/78   11/01/21 126/70   12/23/20 101/60    Wt Readings from Last 3 Encounters:   12/09/21 58.1 kg (128 lb)   11/01/21 59 kg (130 lb)   12/23/20 58.1 kg (128 lb)                  Patient Active Problem List   Diagnosis     Hyperlipidemia LDL goal <100     Family history of early CAD     Degenerative disc disease     Vitamin D deficiency     Abnormal thyroid blood test     Diabetes mellitus without complication (H)     Past Surgical History:   Procedure Laterality Date     NO HISTORY OF SURGERY         Social History     Tobacco Use     Smoking status: Never Smoker     Smokeless tobacco: Never Used     Tobacco comment: Non Smoker   Substance Use Topics     Alcohol use: No     Comment: nothing     Family History   Problem Relation Age of Onset     C.A.D. Father      Asthma No family hx of      Diabetes No family hx of      Hypertension No family hx of      Cancer - colorectal No family hx of      Prostate Cancer No family hx of      Anesthesia Reaction No family hx of      Blood Disease No family hx of      Eye Disorder No family hx of      Osteoporosis No family hx of      Thyroid Disease No family hx of          Current Outpatient Medications   Medication Sig Dispense Refill     Multiple Vitamins-Minerals (MULTIVITAMIN ADULTS PO)        STATIN NOT PRESCRIBED (INTENTIONAL) Please choose reason not prescribed from choices below.       Vitamin D, Cholecalciferol, 25 MCG (1000 UT) TABS Take 2,000 Units by mouth       Allergies   Allergen Reactions     Penicillins Rash     Recent Labs   Lab Test 11/01/21  1449 12/23/20  1520 12/11/19  0956 11/26/18  0816   A1C 6.2* 6.3* 6.5* 6.2*   LDL  --  151* 134* 146*   HDL  --  41 36* 38*   TRIG  --  149 179* 165*   ALT 69 85* 73* 68   CR 0.81 0.84 0.81 0.84  "  GFRESTIMATED >90 >90 >90 >90   GFRESTBLACK  --  >90 >90 >90   POTASSIUM 4.1 4.3 4.4 3.9   TSH  --  5.39* 4.44* 7.94*        Reviewed and updated as needed this visit by clinical staff  Tobacco  Allergies  Meds   Med Hx  Surg Hx  Fam Hx  Soc Hx       Reviewed and updated as needed this visit by Provider               Past Medical History:   Diagnosis Date     Chest pain 12/7/2010     Dandruff 12/7/2010     Degenerative disc disease 1/1/2003     Hyperlipidemia LDL goal <100 9/2/2011     Left hemiparesis (H) 12/22/2016     LFTs abnormal 6/3/2011     (Problem list name updated by automated process. Provider to review and confirm.)     Sensory hearing loss, bilateral 1/11     Skin tags 12/7/2010     Type 2 diabetes, HbA1c goal < 7% (H) 9/2/2011     Vertigo 12/7/2010      Past Surgical History:   Procedure Laterality Date     NO HISTORY OF SURGERY         Review of Systems   Constitutional: Negative for chills and fever.   HENT: Negative for congestion, ear pain, hearing loss and sore throat.    Eyes: Negative for pain and visual disturbance.   Respiratory: Negative for cough and shortness of breath.    Cardiovascular: Negative for chest pain, palpitations and peripheral edema.   Gastrointestinal: Negative for abdominal pain, constipation, diarrhea, heartburn, hematochezia and nausea.   Genitourinary: Negative for dysuria, frequency, genital sores, hematuria, impotence, penile discharge and urgency.   Musculoskeletal: Positive for arthralgias. Negative for joint swelling and myalgias.   Skin: Negative for rash.   Neurological: Negative for dizziness, weakness, headaches and paresthesias.   Psychiatric/Behavioral: Negative for mood changes. The patient is not nervous/anxious.          OBJECTIVE:   /78   Pulse 77   Temp 97.8  F (36.6  C) (Tympanic)   Resp 14   Ht 1.537 m (5' 0.5\")   Wt 58.1 kg (128 lb)   SpO2 100%   BMI 24.59 kg/m      Physical Exam  GENERAL: healthy, alert and no distress  EYES: Eyes " grossly normal to inspection, PERRL and conjunctivae and sclerae normal  HENT: ear canals and TM's normal, nose and mouth without ulcers or lesions  NECK: no adenopathy, no asymmetry, masses, or scars and thyroid normal to palpation  RESP: lungs clear to auscultation - no rales, rhonchi or wheezes  CV: regular rate and rhythm, normal S1 S2, no S3 or S4, no murmur, click or rub, no peripheral edema and peripheral pulses strong  ABDOMEN: soft, nontender, no hepatosplenomegaly, no masses and bowel sounds normal  MS: left shoulder-limited ROM with pain on superior and anterior shoulder, positive empty can sign, negative Neer's, positive Barksdale sign  SKIN: no suspicious lesions or rashes  NEURO: Normal strength and tone, mentation intact and speech normal  Diabetic foot exam: normal DP and PT pulses, no trophic changes or ulcerative lesions and normal sensory exam        ASSESSMENT/PLAN:   (Z00.00) Healthcare maintenance  (primary encounter diagnosis)  Plan: REVIEW OF HEALTH MAINTENANCE PROTOCOL ORDERS,         Lipid panel reflex to direct LDL Fasting,         Albumin Random Urine Quantitative with Creat         Ratio, CBC with platelets, Comprehensive         metabolic panel (BMP + Alb, Alk Phos, ALT, AST,        Total. Bili, TP), Hemoglobin A1c, TSH with free        T4 reflex, STATIN NOT PRESCRIBED (INTENTIONAL),        Vitamin B12, Vitamin D Deficiency            (Z13.220) Screening for hyperlipidemia  Plan: Lipid panel reflex to direct LDL Fasting            (R79.89) Low vitamin D level  Plan: Vitamin D Deficiency            (E53.8) Vitamin B12 deficiency (non anemic)  Plan: Vitamin B12            (E11.9) Diabetes mellitus without complication (H)  Plan: Albumin Random Urine Quantitative with Creat         Ratio, Comprehensive metabolic panel (BMP +         Alb, Alk Phos, ALT, AST, Total. Bili, TP),         Hemoglobin A1c, TSH with free T4 reflex, Adult         Eye Referral, FOOT EXAM            (E78.5)  "Hyperlipidemia LDL goal <100  Plan: Albumin Random Urine Quantitative with Creat         Ratio, Comprehensive metabolic panel (BMP +         Alb, Alk Phos, ALT, AST, Total. Bili, TP),         Hemoglobin A1c, TSH with free T4 reflex            (E03.8) Subclinical hypothyroidism  Plan: TSH with free T4 reflex            (Z23) High priority for 2019-nCoV vaccine  Plan: COVID-19,PF,PFIZER (12+ Yrs PURPLE LABEL)            (M75.02) Adhesive capsulitis of left shoulder  Comment: has been for 9 months with decreased ROM and pain as mentioned above   Plan: [facility]        Will have him to see PT     (S46.002A) Injury of muscle or tendon of left rotator cuff  Comment: mentioned above   Plan: [facility]            (M75.52) Bursitis of left shoulder  Comment: mentioned above   Plan: [facility]              Patient has been advised of split billing requirements and indicates understanding: Yes  COUNSELING:   Reviewed preventive health counseling, as reflected in patient instructions    Estimated body mass index is 24.59 kg/m  as calculated from the following:    Height as of this encounter: 1.537 m (5' 0.5\").    Weight as of this encounter: 58.1 kg (128 lb).         He reports that he has never smoked. He has never used smokeless tobacco.      Counseling Resources:  ATP IV Guidelines  Pooled Cohorts Equation Calculator  FRAX Risk Assessment  ICSI Preventive Guidelines  Dietary Guidelines for Americans, 2010  USDA's MyPlate  ASA Prophylaxis  Lung CA Screening    Vishal Samuels MD  [facility]  "

## 2021-12-10 LAB — DEPRECATED CALCIDIOL+CALCIFEROL SERPL-MC: 63 UG/L (ref 20–75)

## 2021-12-11 LAB
ALBUMIN SERPL-MCNC: 3.8 G/DL (ref 3.4–5)
ALP SERPL-CCNC: 103 U/L (ref 40–150)
ALT SERPL W P-5'-P-CCNC: 83 U/L (ref 0–70)
ANION GAP SERPL CALCULATED.3IONS-SCNC: 5 MMOL/L (ref 3–14)
AST SERPL W P-5'-P-CCNC: 37 U/L (ref 0–45)
BILIRUB SERPL-MCNC: 0.4 MG/DL (ref 0.2–1.3)
BUN SERPL-MCNC: 10 MG/DL (ref 7–30)
CALCIUM SERPL-MCNC: 9 MG/DL (ref 8.5–10.1)
CHLORIDE BLD-SCNC: 103 MMOL/L (ref 94–109)
CO2 SERPL-SCNC: 29 MMOL/L (ref 20–32)
CREAT SERPL-MCNC: 0.83 MG/DL (ref 0.66–1.25)
CREAT UR-MCNC: 171 MG/DL
GFR SERPL CREATININE-BSD FRML MDRD: >90 ML/MIN/1.73M2
GLUCOSE BLD-MCNC: 99 MG/DL (ref 70–99)
MICROALBUMIN UR-MCNC: 16 MG/L
MICROALBUMIN/CREAT UR: 9.36 MG/G CR (ref 0–17)
POTASSIUM BLD-SCNC: 4 MMOL/L (ref 3.4–5.3)
PROT SERPL-MCNC: 7.6 G/DL (ref 6.8–8.8)
SODIUM SERPL-SCNC: 137 MMOL/L (ref 133–144)
TSH SERPL DL<=0.005 MIU/L-ACNC: 3.65 MU/L (ref 0.4–4)

## 2021-12-16 LAB
CHOLEST SERPL-MCNC: 212 MG/DL
FASTING STATUS PATIENT QL REPORTED: YES
HDLC SERPL-MCNC: 46 MG/DL
LDLC SERPL CALC-MCNC: 145 MG/DL
NONHDLC SERPL-MCNC: 166 MG/DL
TRIGL SERPL-MCNC: 103 MG/DL

## 2022-01-14 ENCOUNTER — OFFICE VISIT (OUTPATIENT)
Dept: OPTOMETRY | Facility: CLINIC | Age: 52
End: 2022-01-14
Attending: FAMILY MEDICINE
Payer: COMMERCIAL

## 2022-01-14 DIAGNOSIS — H02.886 MEIBOMIAN GLAND DYSFUNCTION (MGD) OF BOTH EYES: ICD-10-CM

## 2022-01-14 DIAGNOSIS — H52.13 MYOPIA OF BOTH EYES: ICD-10-CM

## 2022-01-14 DIAGNOSIS — E11.9 DIABETES MELLITUS WITHOUT COMPLICATION (H): Primary | ICD-10-CM

## 2022-01-14 DIAGNOSIS — H02.883 MEIBOMIAN GLAND DYSFUNCTION (MGD) OF BOTH EYES: ICD-10-CM

## 2022-01-14 PROCEDURE — 92015 DETERMINE REFRACTIVE STATE: CPT | Performed by: OPTOMETRIST

## 2022-01-14 PROCEDURE — 92004 COMPRE OPH EXAM NEW PT 1/>: CPT | Performed by: OPTOMETRIST

## 2022-01-14 ASSESSMENT — REFRACTION_MANIFEST
OD_CYLINDER: +0.75
OD_SPHERE: -1.75
OD_AXIS: 011
OD_ADD: +2.50
OS_SPHERE: -1.75
OS_CYLINDER: SPHERE
OS_ADD: +2.50

## 2022-01-14 ASSESSMENT — TONOMETRY
OS_IOP_MMHG: 13
OD_IOP_MMHG: 15
IOP_METHOD: ICARE

## 2022-01-14 ASSESSMENT — REFRACTION_WEARINGRX
OS_ADD: +2.25
OD_ADD: +2.25
OD_SPHERE: -2.00
OS_CYLINDER: SPHERE
OD_AXIS: 011
OD_CYLINDER: +0.50
OS_SPHERE: -2.00
SPECS_TYPE: PAL

## 2022-01-14 ASSESSMENT — CUP TO DISC RATIO
OS_RATIO: 0.25
OD_RATIO: 0.25

## 2022-01-14 ASSESSMENT — CONF VISUAL FIELD
OS_NORMAL: 1
METHOD: COUNTING FINGERS
OD_NORMAL: 1

## 2022-01-14 ASSESSMENT — EXTERNAL EXAM - RIGHT EYE: OD_EXAM: NORMAL

## 2022-01-14 ASSESSMENT — EXTERNAL EXAM - LEFT EYE: OS_EXAM: NORMAL

## 2022-01-14 ASSESSMENT — SLIT LAMP EXAM - LIDS
COMMENTS: MEIBOMIAN GLAND DYSFUNCTION
COMMENTS: MEIBOMIAN GLAND DYSFUNCTION

## 2022-01-14 ASSESSMENT — VISUAL ACUITY
OS_CC: 20/20
METHOD: SNELLEN - LINEAR
CORRECTION_TYPE: GLASSES
OD_CC: 20/20

## 2022-01-14 NOTE — LETTER
1/14/2022         RE: Tanner Olvera  [address]  [place] [address]        Dear Colleague,    Thank you for referring your patient, Tanner Olvera, to the [facility]. Please see a copy of my visit note below.    Assessment/Plan  (E11.9) Diabetes mellitus without complication (H)  (primary encounter diagnosis)  Comment: Borderline diabetic. No retinopathy present today.   Plan: Discussed findings with patient. Encouraged continued blood glucose, pressure, and lipid control. Patient should continue following recommendations of primary care provider. Patient should plan on returning to clinic annually for a dilated eye exam but was encouraged to return to clinic sooner with new flashes/floaters or other vision changes.     (H02.883, H02.886) Meibomian gland dysfunction of both eyes  Plan: Begin daily hot compresses with lid massage. Recommend regular use of artificial tears (such as Refresh) 1-2 times daily for dryness and irritation.     (H52.13) Myopia of both eyes  Plan: REFRACTION [[id number]]        Discussed findings with patient. New spectacle prescription dispensed to patient. Patient is welcome to return to clinic with prolonged adaptation difficulties.       Complete documentation of historical and exam elements from today's encounter can  be found in the full encounter summary report (not reduplicated in this progress  note). I personally obtained the chief complaint(s) and history of present illness. I  confirmed and edited as necessary the review of systems, past medical/surgical  history, family history, social history, and examination findings as documented by  others; and I examined the patient myself. I personally reviewed the relevant tests,  images, and reports as documented above. I formulated and edited as necessary the  assessment and plan and discussed the findings and management plan with the  patient and family.    Charli Park OD        Again, thank you for allowing me to participate in the care of your patient.        Sincerely,        Charli Park OD

## 2022-01-14 NOTE — NURSING NOTE
Chief Complaints and History of Present Illnesses   Patient presents with     Annual Eye Exam       Chief Complaint(s) and History of Present Illness(es)     Annual Eye Exam     Laterality: both eyes    Associated symptoms: discharge.  Negative for eye pain, floaters and flashes              Comments     Patient is here for annual eye exam. States he his pre-diabetic and wants a dilated exam. For the past 6 months he has been waking up with some crusty discharge in his eyes. Feels vision is stable, glasses are 2 yrs old.     Pre-Diabetic   Lab Results       Component                Value               Date                       A1C                      6.4                 12/09/2021                 A1C                      6.2                 11/01/2021                 A1C                      6.3                 12/23/2020                 A1C                      6.5                 12/11/2019                 A1C                      6.2                 11/26/2018                 A1C                      6.3                 12/15/2017                 A1C                      6.1                 12/23/2016                          Urban Moreira Ophthalmic Assistant

## 2022-01-14 NOTE — PROGRESS NOTES
Assessment/Plan  (E11.9) Diabetes mellitus without complication (H)  (primary encounter diagnosis)  Comment: Borderline diabetic. No retinopathy present today.   Plan: Discussed findings with patient. Encouraged continued blood glucose, pressure, and lipid control. Patient should continue following recommendations of primary care provider. Patient should plan on returning to clinic annually for a dilated eye exam but was encouraged to return to clinic sooner with new flashes/floaters or other vision changes.     (H02.883, H02.886) Meibomian gland dysfunction of both eyes  Plan: Begin daily hot compresses with lid massage. Recommend regular use of artificial tears (such as Refresh) 1-2 times daily for dryness and irritation.     (H52.13) Myopia of both eyes  Plan: REFRACTION [[id number]]        Discussed findings with patient. New spectacle prescription dispensed to patient. Patient is welcome to return to clinic with prolonged adaptation difficulties.       Complete documentation of historical and exam elements from today's encounter can  be found in the full encounter summary report (not reduplicated in this progress  note). I personally obtained the chief complaint(s) and history of present illness. I  confirmed and edited as necessary the review of systems, past medical/surgical  history, family history, social history, and examination findings as documented by  others; and I examined the patient myself. I personally reviewed the relevant tests,  images, and reports as documented above. I formulated and edited as necessary the  assessment and plan and discussed the findings and management plan with the  patient and family.    Charli Park OD

## 2022-06-26 ENCOUNTER — HEALTH MAINTENANCE LETTER (OUTPATIENT)
Age: 52
End: 2022-06-26

## 2022-07-08 ENCOUNTER — TELEPHONE (OUTPATIENT)
Dept: OPTOMETRY | Facility: CLINIC | Age: 52
End: 2022-07-08

## 2022-07-08 NOTE — TELEPHONE ENCOUNTER
Left Voicemail (2nd Attempt) for the patient to call back and schedule the following:    Appointment type: return   Provider: Dr. Park  Return date: 1/14/2023  Specialty phone number: [phone]  Additonal Notes: yearly eye exam     Caty prakash Procedure   Orthopedics, Podiatry, Sports Medicine, ENT/Eye Specialties  [facility]   [phone]

## 2022-12-12 ENCOUNTER — TELEPHONE (OUTPATIENT)
Dept: FAMILY MEDICINE | Facility: CLINIC | Age: 52
End: 2022-12-12

## 2022-12-12 NOTE — TELEPHONE ENCOUNTER
Patient reports that he was seen in the ED on 12/10/22-12/11/22 for an acute saddle pulmonary embolism following a knee injury three weeks ago and subsequent knee immobilizer.     Patient was instructed to be seen by his PCP within one week of discharge. Patient was unable to schedule an in-person appointment with PCP within one week so instead scheduled a VV:  12/15/2022 10:00 AM (Arrive by 9:40 AM) Vishal Samuels MD [facility]      PCP, patient is agreeable to a VV but is wondering if a VV is acceptable for this hospital follow-up. Please route back to team as patient requests a callback to confirm if VV is ok or if he should reschedule for in-person visit.    Callback: [phone] ok to leave a detailed vm    Ivy Banks RN  -[facility]

## 2022-12-14 NOTE — TELEPHONE ENCOUNTER
Patient Contact    Attempt # 1    Was call answered?  Yes. Writer relayed PCP's message below. Patient expressed verbal understanding and plans to keep 12/15/22 appointment.    Ivy Banks RN  [facility]

## 2022-12-15 ENCOUNTER — VIRTUAL VISIT (OUTPATIENT)
Dept: FAMILY MEDICINE | Facility: CLINIC | Age: 52
End: 2022-12-15
Payer: COMMERCIAL

## 2022-12-15 DIAGNOSIS — E11.9 DIABETES MELLITUS WITHOUT COMPLICATION (H): ICD-10-CM

## 2022-12-15 DIAGNOSIS — I82.412 ACUTE DEEP VEIN THROMBOSIS (DVT) OF FEMORAL VEIN OF LEFT LOWER EXTREMITY (H): ICD-10-CM

## 2022-12-15 DIAGNOSIS — S82.092D OTHER CLOSED FRACTURE OF LEFT PATELLA WITH ROUTINE HEALING, SUBSEQUENT ENCOUNTER: ICD-10-CM

## 2022-12-15 DIAGNOSIS — I26.92 ACUTE SADDLE PULMONARY EMBOLISM WITHOUT ACUTE COR PULMONALE (H): Primary | ICD-10-CM

## 2022-12-15 PROBLEM — S82.002A LEFT PATELLA FRACTURE: Status: ACTIVE | Noted: 2022-12-10

## 2022-12-15 PROCEDURE — 99215 OFFICE O/P EST HI 40 MIN: CPT | Mod: 95 | Performed by: FAMILY MEDICINE

## 2022-12-15 RX ORDER — ACETAMINOPHEN 325 MG/1
650 TABLET ORAL
COMMUNITY
Start: 2022-12-11 | End: 2023-03-20

## 2022-12-15 RX ORDER — DIPHENOXYLATE HYDROCHLORIDE AND ATROPINE SULFATE 2.5; .025 MG/1; MG/1
1 TABLET ORAL DAILY
COMMUNITY

## 2022-12-15 NOTE — PROGRESS NOTES
Tanner is a 52 year old who is being evaluated via a billable video visit.      How would you like to obtain your AVS? MyChart  If the video visit is dropped, the invitation should be resent by: Text to cell phone: [phone]  Will anyone else be joining your video visit? No          Assessment & Plan     Diabetes mellitus without complication (H)  Had patella fracture on left knee, then started DVT and PE  Will keep monitoring with diet control, will recheck in 2-3 months after being allowed to do normal ambulation     Acute saddle pulmonary embolism without acute cor pulmonale (H)  Currently on Eliquis, scheduled thrombosis clinic  Will have him to check with me again in 3-4 months for recheck PE and DVT via LE US and pulmonary CT angio     Acute deep vein thrombosis (DVT) of femoral vein of left lower extremity (H)  Mentioned above     Other closed fracture of left patella with routine healing, subsequent encounter  Seeing ortho,       {Provider  Link to [facility] Help Grid :[id number]}    FUTURE APPOINTMENTS:       - Follow-up visit in 4 months     No follow-ups on file.    Vishal Samuels MD  [facility]   Tanner is a 52 year old presenting for the following health issues:  Hospital F/U      [facility]     ED/UC Followup:    Facility:  [facility]  Date of visit: 12/10/2022  Reason for visit: Acute Saddle Pulmonary Embolism   Current Status: So far so good.         Review of Systems   Constitutional, HEENT, cardiovascular, pulmonary, gi and gu systems are negative, except as otherwise noted.      Objective           Vitals:  No vitals were obtained today due to virtual visit.    Physical Exam   GENERAL: Healthy, alert and no distress  EYES: Eyes grossly normal to inspection.  No discharge or erythema, or obvious scleral/conjunctival abnormalities.  RESP: No audible wheeze, cough, or visible cyanosis.  No visible retractions or increased work of breathing.    SKIN: Visible skin clear. No  significant rash, abnormal pigmentation or lesions.  NEURO: Cranial nerves grossly intact.  Mentation and speech appropriate for age.  PSYCH: Mentation appears normal, affect normal/bright, judgement and insight intact, normal speech and appearance well-groomed.            Video-Visit Details    Video Start Time: 9:00    Type of service:  Video Visit    Video End Time:9:40 AM    Originating Location (pt. Location): Home        Distant Location (provider location):  On-site    Platform used for Video Visit: Corazon

## 2022-12-16 DIAGNOSIS — I82.412 ACUTE DEEP VEIN THROMBOSIS (DVT) OF FEMORAL VEIN OF LEFT LOWER EXTREMITY (H): Primary | ICD-10-CM

## 2022-12-16 NOTE — TELEPHONE ENCOUNTER
Medication Question or Refill    Contacts       Type Contact Phone/Fax    12/16/2022 10:30 AM CST Phone (Incoming) Tanner Olvera (Self) [phone] (M)          What medication are you calling about (include dose and sig)?: apixaban ANTICOAGULANT (ELIQUIS) 5 MG tablet    Controlled Substance Agreement on file:   CSA -- Patient Level:    CSA: None found at the patient level.       Who prescribed the medication?: an ER doctor    Do you need a refill? Yes: pt forgot to ask for a refill at his appointment with Dr. Samuels 12/15/22    When did you use the medication last? today    Patient offered an appointment? No    Do you have any questions or concerns?  No    Preferred Pharmacy:  [id number] [place], [place] - [address] [facility] [id number]  [facility] [address]  Phone: [phone] Fax: [phone]      Jodie Walker/Eli-  [facility]

## 2023-02-16 DIAGNOSIS — I82.412 ACUTE DEEP VEIN THROMBOSIS (DVT) OF FEMORAL VEIN OF LEFT LOWER EXTREMITY (H): ICD-10-CM

## 2023-02-16 RX ORDER — APIXABAN 5 MG/1
TABLET, FILM COATED ORAL
Qty: 60 TABLET | Refills: 1 | Status: SHIPPED | OUTPATIENT
Start: 2023-02-16 | End: 2023-04-17

## 2023-03-20 ENCOUNTER — OFFICE VISIT (OUTPATIENT)
Dept: OPTOMETRY | Facility: CLINIC | Age: 53
End: 2023-03-20
Payer: COMMERCIAL

## 2023-03-20 DIAGNOSIS — H52.13 MYOPIA OF BOTH EYES: ICD-10-CM

## 2023-03-20 DIAGNOSIS — H52.223 REGULAR ASTIGMATISM OF BOTH EYES: ICD-10-CM

## 2023-03-20 DIAGNOSIS — H52.4 PRESBYOPIA: ICD-10-CM

## 2023-03-20 DIAGNOSIS — E11.9 DIABETES MELLITUS WITHOUT COMPLICATION (H): Primary | ICD-10-CM

## 2023-03-20 PROCEDURE — 92015 DETERMINE REFRACTIVE STATE: CPT | Performed by: OPTOMETRIST

## 2023-03-20 PROCEDURE — 92014 COMPRE OPH EXAM EST PT 1/>: CPT | Performed by: OPTOMETRIST

## 2023-03-20 ASSESSMENT — REFRACTION_MANIFEST
OD_ADD: +2.25
OD_CYLINDER: +0.50
OD_AXIS: 015
OD_AXIS: 005
OD_SPHERE: -1.25
OS_AXIS: 125
OS_AXIS: 154
OS_SPHERE: -1.75
OS_CYLINDER: +0.50
OS_SPHERE: -1.75
OS_ADD: +2.25
OD_SPHERE: -1.50
METHOD_AUTOREFRACTION: 1
OS_CYLINDER: +0.50
OD_CYLINDER: +0.75

## 2023-03-20 ASSESSMENT — CONF VISUAL FIELD
OD_INFERIOR_TEMPORAL_RESTRICTION: 0
OD_INFERIOR_NASAL_RESTRICTION: 0
OD_SUPERIOR_TEMPORAL_RESTRICTION: 0
OD_SUPERIOR_NASAL_RESTRICTION: 0
OS_SUPERIOR_NASAL_RESTRICTION: 0
OS_SUPERIOR_TEMPORAL_RESTRICTION: 0
OS_INFERIOR_NASAL_RESTRICTION: 0
OD_NORMAL: 1
OS_NORMAL: 1
METHOD: COUNTING FINGERS
OS_INFERIOR_TEMPORAL_RESTRICTION: 0

## 2023-03-20 ASSESSMENT — CUP TO DISC RATIO
OS_RATIO: 0.25
OD_RATIO: 0.25

## 2023-03-20 ASSESSMENT — REFRACTION_WEARINGRX
SPECS_TYPE: PAL
OD_CYLINDER: +0.50
OD_AXIS: 018
OS_ADD: +2.25
OS_CYLINDER: +0.50
OD_SPHERE: -2.00
OD_ADD: +2.25
OS_AXIS: 120
OS_SPHERE: -2.00

## 2023-03-20 ASSESSMENT — VISUAL ACUITY
OS_SC: 20/20
OD_SC: 20/70
OD_CC: 20/20
OD_SC: 20/25+1
OS_CC: 20/20
CORRECTION_TYPE: GLASSES
OS_CC: 20/20
OS_SC: 20/100
OD_CC: 20/20
METHOD: SNELLEN - LINEAR
OS_CC+: -1
OD_CC+: -1

## 2023-03-20 ASSESSMENT — EXTERNAL EXAM - RIGHT EYE: OD_EXAM: NORMAL

## 2023-03-20 ASSESSMENT — KERATOMETRY
OD_AXISANGLE_DEGREES: 006
OS_K1POWER_DIOPTERS: 45.50
OS_K2POWER_DIOPTERS: 46.00
OD_K1POWER_DIOPTERS: 45.25
OD_AXISANGLE2_DEGREES: 096
OS_AXISANGLE2_DEGREES: 060
OD_K2POWER_DIOPTERS: 46.25
OS_AXISANGLE_DEGREES: 150

## 2023-03-20 ASSESSMENT — TONOMETRY
OS_IOP_MMHG: 19
OD_IOP_MMHG: 19
IOP_METHOD: APPLANATION

## 2023-03-20 ASSESSMENT — SLIT LAMP EXAM - LIDS
COMMENTS: MEIBOMIAN GLAND DYSFUNCTION
COMMENTS: MEIBOMIAN GLAND DYSFUNCTION

## 2023-03-20 ASSESSMENT — EXTERNAL EXAM - LEFT EYE: OS_EXAM: NORMAL

## 2023-03-20 NOTE — PROGRESS NOTES
Chief Complaint   Patient presents with     Diabetic Eye Exam        Chief Complaint(s) and History of Present Illness(es)     Diabetic Eye Exam            Vision: is stable    Diabetes Type: controlled with diet (Pre-diabetic)    Duration: years    Blood Sugars: is controlled (Doesn't check at home)               Lab Results   Component Value Date    A1C 6.4 12/09/2021    A1C 6.2 11/01/2021    A1C 6.3 12/23/2020    A1C 6.5 12/11/2019    A1C 6.2 11/26/2018    A1C 6.3 12/15/2017    A1C 6.1 12/23/2016            Last Eye Exam: 1/14/2022 Dr. Park   Dilated Previously: Yes, side effects of dilation explained today    What are you currently using to see?  Glasses -PAL's - wears most of the time  -Sometimes takes off to read at home     Distance Vision Acuity: Satisfied with vision    Near Vision Acuity: Satisfied with vision while reading and using computer with glasses and unaided     Eye Comfort: good  Do you use eye drops? : No   Occupation or Hobbies:  - lots of screen time     Soumya Zan     Medical, surgical and family histories reviewed and updated 3/20/2023.       OBJECTIVE: See Ophthalmology exam    ASSESSMENT:    ICD-10-CM    1. Diabetes mellitus without complication (H)  E11.9       2. Myopia of both eyes  H52.13       3. Regular astigmatism of both eyes  H52.223       4. Presbyopia  H52.4           PLAN:    Tanner Olvera aware  eye exam results will be sent to Vishal Samuels  Patient Instructions   Optional to fill new glasses prescription, minimal change  Keep blood sugar under good control  Return in 1 year for diabetic eye exam      Blood sugar and blood pressure control are very important in the prevention of ocular complications from diabetes.       Gladys Jacinto, OD  [phone]

## 2023-03-20 NOTE — LETTER
3/20/2023         RE: Tanner Olvera  [address]  [place] [address]        Dear Colleague,    Thank you for referring your patient, Tanner Olvera, to the [facility]. .No diabetic retinopathy was found at eye exam.   Please see a copy of my visit note below.    Chief Complaint   Patient presents with     Diabetic Eye Exam        Chief Complaint(s) and History of Present Illness(es)     Diabetic Eye Exam            Vision: is stable    Diabetes Type: controlled with diet (Pre-diabetic)    Duration: years    Blood Sugars: is controlled (Doesn't check at home)               Lab Results   Component Value Date    A1C 6.4 12/09/2021    A1C 6.2 11/01/2021    A1C 6.3 12/23/2020    A1C 6.5 12/11/2019    A1C 6.2 11/26/2018    A1C 6.3 12/15/2017    A1C 6.1 12/23/2016            Last Eye Exam: 1/14/2022 Dr. Park   Dilated Previously: Yes, side effects of dilation explained today    What are you currently using to see?  Glasses -PAL's - wears most of the time  -Sometimes takes off to read at home     Distance Vision Acuity: Satisfied with vision    Near Vision Acuity: Satisfied with vision while reading and using computer with glasses and unaided     Eye Comfort: good  Do you use eye drops? : No   Occupation or Hobbies:  - lots of screen time     Soumya Zuluaga     Medical, surgical and family histories reviewed and updated 3/20/2023.       OBJECTIVE: See Ophthalmology exam    ASSESSMENT:    ICD-10-CM    1. Diabetes mellitus without complication (H)  E11.9       2. Myopia of both eyes  H52.13       3. Regular astigmatism of both eyes  H52.223       4. Presbyopia  H52.4           PLAN:    Tanner Chaveztitasharan aware  eye exam results will be sent to Vishal Samuels  Patient Instructions   Optional to fill new glasses prescription, minimal change  Keep blood sugar under good control  Return in 1 year for diabetic eye exam      Blood sugar and blood pressure  control are very important in the prevention of ocular complications from diabetes.       Gladys Jacinto, OD  [phone]                        Again, thank you for allowing me to participate in the care of your patient.        Sincerely,        Gladys Jacinto, OD

## 2023-03-20 NOTE — PATIENT INSTRUCTIONS
Optional to fill new glasses prescription, minimal change  Keep blood sugar under good control  Return in 1 year for diabetic eye exam      Blood sugar and blood pressure control are very important in the prevention of ocular complications from diabetes.       Gladys Jacinto, OD  [phone]              Humira Counseling:  I discussed with the patient the risks of adalimumab including but not limited to myelosuppression, immunosuppression, autoimmune hepatitis, demyelinating diseases, lymphoma, and serious infections.  The patient understands that monitoring is required including a PPD at baseline and must alert us or the primary physician if symptoms of infection or other concerning signs are noted.

## 2023-03-22 ENCOUNTER — LAB (OUTPATIENT)
Dept: LAB | Facility: CLINIC | Age: 53
End: 2023-03-22
Payer: COMMERCIAL

## 2023-03-22 DIAGNOSIS — E03.8 SUBCLINICAL HYPOTHYROIDISM: ICD-10-CM

## 2023-03-22 DIAGNOSIS — R79.89 LOW VITAMIN D LEVEL: ICD-10-CM

## 2023-03-22 DIAGNOSIS — E11.9 DIABETES MELLITUS WITHOUT COMPLICATION (H): ICD-10-CM

## 2023-03-22 DIAGNOSIS — E53.8 VITAMIN B12 DEFICIENCY (NON ANEMIC): ICD-10-CM

## 2023-03-22 DIAGNOSIS — Z12.5 SCREENING FOR PROSTATE CANCER: ICD-10-CM

## 2023-03-22 DIAGNOSIS — Z00.00 HEALTH MAINTENANCE EXAMINATION: ICD-10-CM

## 2023-03-22 LAB
ALBUMIN SERPL BCG-MCNC: 4.5 G/DL (ref 3.5–5.2)
ALP SERPL-CCNC: 108 U/L (ref 40–129)
ALT SERPL W P-5'-P-CCNC: 41 U/L (ref 10–50)
ANION GAP SERPL CALCULATED.3IONS-SCNC: 16 MMOL/L (ref 7–15)
AST SERPL W P-5'-P-CCNC: 30 U/L (ref 10–50)
BILIRUB SERPL-MCNC: 0.3 MG/DL
BUN SERPL-MCNC: 8.4 MG/DL (ref 6–20)
CALCIUM SERPL-MCNC: 9.5 MG/DL (ref 8.6–10)
CHLORIDE SERPL-SCNC: 101 MMOL/L (ref 98–107)
CHOLEST SERPL-MCNC: 200 MG/DL
CREAT SERPL-MCNC: 0.82 MG/DL (ref 0.67–1.17)
CREAT UR-MCNC: 234 MG/DL
DEPRECATED HCO3 PLAS-SCNC: 24 MMOL/L (ref 22–29)
ERYTHROCYTE [DISTWIDTH] IN BLOOD BY AUTOMATED COUNT: 14 % (ref 10–15)
GFR SERPL CREATININE-BSD FRML MDRD: >90 ML/MIN/1.73M2
GLUCOSE SERPL-MCNC: 95 MG/DL (ref 70–99)
HBA1C MFR BLD: 6.3 % (ref 0–5.6)
HCT VFR BLD AUTO: 46.4 % (ref 40–53)
HDLC SERPL-MCNC: 50 MG/DL
HGB BLD-MCNC: 15.3 G/DL (ref 13.3–17.7)
LDLC SERPL CALC-MCNC: 124 MG/DL
MCH RBC QN AUTO: 27.4 PG (ref 26.5–33)
MCHC RBC AUTO-ENTMCNC: 33 G/DL (ref 31.5–36.5)
MCV RBC AUTO: 83 FL (ref 78–100)
MICROALBUMIN UR-MCNC: 15.5 MG/L
MICROALBUMIN/CREAT UR: 6.62 MG/G CR (ref 0–17)
NONHDLC SERPL-MCNC: 150 MG/DL
PLATELET # BLD AUTO: 355 10E3/UL (ref 150–450)
POTASSIUM SERPL-SCNC: 4.6 MMOL/L (ref 3.4–5.3)
PROT SERPL-MCNC: 7.8 G/DL (ref 6.4–8.3)
RBC # BLD AUTO: 5.59 10E6/UL (ref 4.4–5.9)
SODIUM SERPL-SCNC: 141 MMOL/L (ref 136–145)
TRIGL SERPL-MCNC: 131 MG/DL
TSH SERPL DL<=0.005 MIU/L-ACNC: 4.39 UIU/ML (ref 0.3–4.2)
VIT B12 SERPL-MCNC: 836 PG/ML (ref 232–1245)
WBC # BLD AUTO: 9 10E3/UL (ref 4–11)

## 2023-03-22 PROCEDURE — 83036 HEMOGLOBIN GLYCOSYLATED A1C: CPT

## 2023-03-22 PROCEDURE — 84443 ASSAY THYROID STIM HORMONE: CPT

## 2023-03-22 PROCEDURE — 80053 COMPREHEN METABOLIC PANEL: CPT

## 2023-03-22 PROCEDURE — 80061 LIPID PANEL: CPT

## 2023-03-22 PROCEDURE — 82570 ASSAY OF URINE CREATININE: CPT

## 2023-03-22 PROCEDURE — G0103 PSA SCREENING: HCPCS

## 2023-03-22 PROCEDURE — 36415 COLL VENOUS BLD VENIPUNCTURE: CPT

## 2023-03-22 PROCEDURE — 82607 VITAMIN B-12: CPT

## 2023-03-22 PROCEDURE — 82306 VITAMIN D 25 HYDROXY: CPT

## 2023-03-22 PROCEDURE — 84439 ASSAY OF FREE THYROXINE: CPT

## 2023-03-22 PROCEDURE — 85027 COMPLETE CBC AUTOMATED: CPT

## 2023-03-22 PROCEDURE — 82043 UR ALBUMIN QUANTITATIVE: CPT

## 2023-03-23 LAB
DEPRECATED CALCIDIOL+CALCIFEROL SERPL-MC: 74 UG/L (ref 20–75)
PSA SERPL DL<=0.01 NG/ML-MCNC: 0.91 NG/ML (ref 0–3.5)
T4 FREE SERPL-MCNC: 1.22 NG/DL (ref 0.9–1.7)

## 2023-04-15 DIAGNOSIS — I82.412 ACUTE DEEP VEIN THROMBOSIS (DVT) OF FEMORAL VEIN OF LEFT LOWER EXTREMITY (H): ICD-10-CM

## 2023-04-16 ENCOUNTER — HEALTH MAINTENANCE LETTER (OUTPATIENT)
Age: 53
End: 2023-04-16

## 2023-04-17 RX ORDER — APIXABAN 5 MG/1
TABLET, FILM COATED ORAL
Qty: 60 TABLET | Refills: 3 | Status: SHIPPED | OUTPATIENT
Start: 2023-04-17 | End: 2023-08-29

## 2023-05-11 ASSESSMENT — ENCOUNTER SYMPTOMS
CHILLS: 0
DYSURIA: 0
COUGH: 0
DIARRHEA: 0
FREQUENCY: 0
WEAKNESS: 0
HEADACHES: 0
HEARTBURN: 0
HEMATURIA: 0
HEMATOCHEZIA: 0
NAUSEA: 0
ARTHRALGIAS: 0
ABDOMINAL PAIN: 0
FEVER: 0
NERVOUS/ANXIOUS: 0
SORE THROAT: 0
PALPITATIONS: 0
PARESTHESIAS: 0
JOINT SWELLING: 0
SHORTNESS OF BREATH: 0
EYE PAIN: 0
DIZZINESS: 0
CONSTIPATION: 0

## 2023-05-12 ENCOUNTER — OFFICE VISIT (OUTPATIENT)
Dept: FAMILY MEDICINE | Facility: CLINIC | Age: 53
End: 2023-05-12
Payer: COMMERCIAL

## 2023-05-12 VITALS
HEART RATE: 80 BPM | BODY MASS INDEX: 23 KG/M2 | SYSTOLIC BLOOD PRESSURE: 110 MMHG | WEIGHT: 125 LBS | HEIGHT: 62 IN | DIASTOLIC BLOOD PRESSURE: 84 MMHG | TEMPERATURE: 98.1 F | OXYGEN SATURATION: 97 % | RESPIRATION RATE: 10 BRPM

## 2023-05-12 DIAGNOSIS — Z12.11 SCREEN FOR COLON CANCER: Primary | ICD-10-CM

## 2023-05-12 DIAGNOSIS — I82.412 ACUTE DEEP VEIN THROMBOSIS (DVT) OF FEMORAL VEIN OF LEFT LOWER EXTREMITY (H): ICD-10-CM

## 2023-05-12 DIAGNOSIS — Z00.00 HEALTH MAINTENANCE EXAMINATION: ICD-10-CM

## 2023-05-12 DIAGNOSIS — L98.9 SKIN LESION: ICD-10-CM

## 2023-05-12 PROCEDURE — 99396 PREV VISIT EST AGE 40-64: CPT | Performed by: FAMILY MEDICINE

## 2023-05-12 ASSESSMENT — ENCOUNTER SYMPTOMS
ARTHRALGIAS: 0
CHILLS: 0
PARESTHESIAS: 0
EYE PAIN: 0
SORE THROAT: 0
HEARTBURN: 0
FREQUENCY: 0
DIARRHEA: 0
NERVOUS/ANXIOUS: 0
DYSURIA: 0
DIZZINESS: 0
SHORTNESS OF BREATH: 0
WEAKNESS: 0
JOINT SWELLING: 0
CONSTIPATION: 0
FEVER: 0
PALPITATIONS: 0
NAUSEA: 0
COUGH: 0
HEADACHES: 0
ABDOMINAL PAIN: 0
HEMATOCHEZIA: 0
HEMATURIA: 0

## 2023-05-12 ASSESSMENT — PAIN SCALES - GENERAL: PAINLEVEL: NO PAIN (0)

## 2023-05-12 NOTE — PROGRESS NOTES
SUBJECTIVE:   CC: Tanner is an 52 year old who presents for preventative health visit.       5/12/2023     8:34 AM   Additional Questions   Roomed by KM     Patient has been advised of split billing requirements and indicates understanding: Yes  Healthy Habits:     Getting at least 3 servings of Calcium per day:  Yes    Bi-annual eye exam:  Yes    Dental care twice a year:  Yes    Sleep apnea or symptoms of sleep apnea:  Excessive snoring    Diet:  Vegetarian/vegan    Frequency of exercise:  None    Taking medications regularly:  Yes    Medication side effects:  None    PHQ-2 Total Score: 0    Additional concerns today:  Yes        Today's PHQ-2 Score:       5/11/2023    10:54 PM   PHQ-2 ( 1999 [employer])   Q1: Little interest or pleasure in doing things 0   Q2: Feeling down, depressed or hopeless 0   PHQ-2 Score 0   Q1: Little interest or pleasure in doing things Not at all    Not at all   Q2: Feeling down, depressed or hopeless Not at all    Not at all   PHQ-2 Score 0    0           Social History     Tobacco Use     Smoking status: Never     Smokeless tobacco: Never     Tobacco comments:     Non Smoker   Vaping Use     Vaping status: Never Used     Passive vaping exposure: Yes   Substance Use Topics     Alcohol use: No     Comment: nothing             5/11/2023    10:54 PM   Alcohol Use   Prescreen: >3 drinks/day or >7 drinks/week? Not Applicable          View : No data to display.                Last PSA:   Prostate Specific Antigen Screen   Date Value Ref Range Status   03/22/2023 0.91 0.00 - 3.50 ng/mL Final       Reviewed orders with patient. Reviewed health maintenance and updated orders accordingly - Yes  BP Readings from Last 3 Encounters:   05/12/23 110/84   12/09/21 124/78   11/01/21 126/70    Wt Readings from Last 3 Encounters:   05/12/23 56.7 kg (125 lb)   12/09/21 58.1 kg (128 lb)   11/01/21 59 kg (130 lb)                  Patient Active Problem List   Diagnosis     Hyperlipidemia LDL goal <100      Family history of early CAD     Degenerative disc disease     Vitamin D deficiency     Abnormal thyroid blood test     Diabetes mellitus without complication (H)     Acute saddle pulmonary embolism without acute cor pulmonale (H)     Acute deep vein thrombosis (DVT) of femoral vein of left lower extremity (H)     Left patella fracture     Past Surgical History:   Procedure Laterality Date     NO HISTORY OF SURGERY         Social History     Tobacco Use     Smoking status: Never     Smokeless tobacco: Never     Tobacco comments:     Non Smoker   Vaping Use     Vaping status: Never Used     Passive vaping exposure: Yes   Substance Use Topics     Alcohol use: No     Comment: nothing     Family History   Problem Relation Age of Onset     C.A.D. Father      Asthma No family hx of      Diabetes No family hx of      Hypertension No family hx of      Cancer - colorectal No family hx of      Prostate Cancer No family hx of      Anesthesia Reaction No family hx of      Blood Disease No family hx of      Eye Disorder No family hx of      Osteoporosis No family hx of      Thyroid Disease No family hx of      Glaucoma No family hx of      Macular Degeneration No family hx of          Current Outpatient Medications   Medication Sig Dispense Refill     ELIQUIS ANTICOAGULANT 5 MG tablet TAKE 1 TABLET BY MOUTH TWICE DAILY 60 tablet 3     Multiple Vitamin (MULTI-VITAMINS) TABS Take 1 tablet by mouth daily       Vitamin D, Cholecalciferol, 25 MCG (1000 UT) TABS Take 2,000 Units by mouth       apixaban ANTICOAGULANT (ELIQUIS) 5 MG tablet Take 5 mg by mouth       Multiple Vitamins-Minerals (MULTIVITAMIN ADULTS PO)        STATIN NOT PRESCRIBED (INTENTIONAL) Please choose reason not prescribed from choices below. (Patient not taking: Reported on 1/14/2022)       Allergies   Allergen Reactions     Penicillins Rash     Recent Labs   Lab Test 03/22/23  1301 12/09/21  1205 11/01/21  1449 11/01/21  1449 12/23/20  1520 12/11/19  0956   A1C 6.3*  6.4*  --  6.2* 6.3* 6.5*   * 145*  --   --  151* 134*   HDL 50 46  --   --  41 36*   TRIG 131 103  --   --  149 179*   ALT 41 83*  --  69 85* 73*   CR 0.82 0.83   < > 0.81 0.84 0.81   GFRESTIMATED >90 >90   < > >90 >90 >90   GFRESTBLACK  --   --   --   --  >90 >90   POTASSIUM 4.6 4.0   < > 4.1 4.3 4.4   TSH 4.39* 3.65  --   --  5.39* 4.44*    < > = values in this interval not displayed.        Reviewed and updated as needed this visit by clinical staff   Tobacco  Allergies  Meds              Reviewed and updated as needed this visit by Provider                 Past Medical History:   Diagnosis Date     Chest pain 12/7/2010     Dandruff 12/7/2010     Degenerative disc disease 1/1/2003     Hyperlipidemia LDL goal <100 9/2/2011     Left hemiparesis (H) 12/22/2016     LFTs abnormal 6/3/2011     (Problem list name updated by automated process. Provider to review and confirm.)     Sensory hearing loss, bilateral 1/11     Skin tags 12/7/2010     Type 2 diabetes, HbA1c goal < 7% (H) 9/2/2011     Vertigo 12/7/2010      Past Surgical History:   Procedure Laterality Date     NO HISTORY OF SURGERY         Review of Systems   Constitutional: Negative for chills and fever.   HENT: Negative for congestion, ear pain, hearing loss and sore throat.    Eyes: Negative for pain and visual disturbance.   Respiratory: Negative for cough and shortness of breath.    Cardiovascular: Negative for chest pain, palpitations and peripheral edema.   Gastrointestinal: Negative for abdominal pain, constipation, diarrhea, heartburn, hematochezia and nausea.   Genitourinary: Negative for dysuria, frequency, genital sores, hematuria, impotence, penile discharge and urgency.   Musculoskeletal: Negative for arthralgias and joint swelling.   Skin: Negative for rash.   Neurological: Negative for dizziness, weakness, headaches and paresthesias.   Psychiatric/Behavioral: Negative for mood changes. The patient is not nervous/anxious.   "        OBJECTIVE:   /84   Pulse 80   Temp 98.1  F (36.7  C) (Tympanic)   Resp 10   Ht 1.565 m (5' 1.61\")   Wt 56.7 kg (125 lb)   SpO2 97%   BMI 23.15 kg/m      Physical Exam  GENERAL: healthy, alert and no distress  EYES: Eyes grossly normal to inspection, PERRL and conjunctivae and sclerae normal  HENT: ear canals and TM's normal, nose and mouth without ulcers or lesions  NECK: no adenopathy, no asymmetry, masses, or scars and thyroid normal to palpation  RESP: lungs clear to auscultation - no rales, rhonchi or wheezes  CV: regular rate and rhythm, normal S1 S2, no S3 or S4, no murmur, click or rub, no peripheral edema and peripheral pulses strong  ABDOMEN: soft, nontender, no hepatosplenomegaly, no masses and bowel sounds normal  MS: no gross musculoskeletal defects noted, no edema  SKIN: no suspicious lesions or rashes  NEURO: Normal strength and tone, mentation intact and speech normal  BACK: no CVA tenderness, no paralumbar tenderness  PSYCH: mentation appears normal, affect normal/bright  LYMPH: no cervical, supraclavicular, axillary, or inguinal adenopathy        ASSESSMENT/PLAN:       ICD-10-CM    1. Screen for colon cancer  Z12.11       2. Acute deep vein thrombosis (DVT) of femoral vein of left lower extremity (H)  I82.412 US Lower Extremity Venous Duplex Left     CTA Chest with Contrast      3. Health maintenance examination  Z00.00       4. Skin lesion  L98.9 Adult Dermatology Referral          Patient has been advised of split billing requirements and indicates understanding: Yes      COUNSELING:   Reviewed preventive health counseling, as reflected in patient instructions        He reports that he has never smoked. He has never used smokeless tobacco.            Vishal Samuels MD  [facility]  "

## 2023-06-05 ENCOUNTER — TELEPHONE (OUTPATIENT)
Dept: FAMILY MEDICINE | Facility: CLINIC | Age: 53
End: 2023-06-05
Payer: COMMERCIAL

## 2023-06-05 DIAGNOSIS — R00.2 PALPITATIONS: Primary | ICD-10-CM

## 2023-06-05 NOTE — TELEPHONE ENCOUNTER
Patient was seen at  for light headedness. States that they sent to ER because he had sinus tachycardia. Patient has history of PE 6 months ago.  Had a CT that showed PE gone. DVT is significantly reduced.    Patient has following questions:    1) Should patient schedule hospital follow up with Dr. Samuels. Patient states that he was seen at [facility] and requests Dr. Samuels review notes. VV or in person depending on what Dr. Samuels wants.     2) Patient is on Eliquis and is asking how long he needs to take it?    3) ER doctor wanted the patient to have a CT calcium. Patient is wondering if it is OK for him to have another CT scan so soon?    4) Patient was given referral from Dr. Samuels for skin lesion. Soonest the patient can be seen by dermatology is 1/2024 unless Dr. Samuels can order an urgent referral.    Can we leave a detailed message on this number? YES  Phone number patient can be reached at: Cell number on file:    Telephone Information:   Mobile [phone]       Vicki Wall RN  [facility] Triage

## 2023-06-06 ENCOUNTER — LAB (OUTPATIENT)
Dept: LAB | Facility: CLINIC | Age: 53
End: 2023-06-06
Payer: COMMERCIAL

## 2023-06-06 DIAGNOSIS — R00.2 PALPITATIONS: ICD-10-CM

## 2023-06-06 LAB
ALBUMIN SERPL BCG-MCNC: 4.5 G/DL (ref 3.5–5.2)
ALP SERPL-CCNC: 100 U/L (ref 40–129)
ALT SERPL W P-5'-P-CCNC: 27 U/L (ref 10–50)
ANION GAP SERPL CALCULATED.3IONS-SCNC: 12 MMOL/L (ref 7–15)
AST SERPL W P-5'-P-CCNC: 26 U/L (ref 10–50)
BASOPHILS # BLD AUTO: 0 10E3/UL (ref 0–0.2)
BASOPHILS NFR BLD AUTO: 1 %
BILIRUB SERPL-MCNC: 0.2 MG/DL
BUN SERPL-MCNC: 6.9 MG/DL (ref 6–20)
CALCIUM SERPL-MCNC: 9.5 MG/DL (ref 8.6–10)
CHLORIDE SERPL-SCNC: 101 MMOL/L (ref 98–107)
CREAT SERPL-MCNC: 0.71 MG/DL (ref 0.67–1.17)
DEPRECATED HCO3 PLAS-SCNC: 25 MMOL/L (ref 22–29)
EOSINOPHIL # BLD AUTO: 0.1 10E3/UL (ref 0–0.7)
EOSINOPHIL NFR BLD AUTO: 1 %
ERYTHROCYTE [DISTWIDTH] IN BLOOD BY AUTOMATED COUNT: 13 % (ref 10–15)
ERYTHROCYTE [SEDIMENTATION RATE] IN BLOOD BY WESTERGREN METHOD: 21 MM/HR (ref 0–20)
FERRITIN SERPL-MCNC: 79 NG/ML (ref 31–409)
GFR SERPL CREATININE-BSD FRML MDRD: >90 ML/MIN/1.73M2
GLUCOSE SERPL-MCNC: 161 MG/DL (ref 70–99)
HCT VFR BLD AUTO: 43.4 % (ref 40–53)
HGB BLD-MCNC: 14.6 G/DL (ref 13.3–17.7)
IMM GRANULOCYTES # BLD: 0 10E3/UL
IMM GRANULOCYTES NFR BLD: 0 %
IRON BINDING CAPACITY (ROCHE): 360 UG/DL (ref 240–430)
IRON SATN MFR SERPL: 11 % (ref 15–46)
IRON SERPL-MCNC: 40 UG/DL (ref 61–157)
LYMPHOCYTES # BLD AUTO: 2 10E3/UL (ref 0.8–5.3)
LYMPHOCYTES NFR BLD AUTO: 22 %
MCH RBC QN AUTO: 28.2 PG (ref 26.5–33)
MCHC RBC AUTO-ENTMCNC: 33.6 G/DL (ref 31.5–36.5)
MCV RBC AUTO: 84 FL (ref 78–100)
MONOCYTES # BLD AUTO: 0.6 10E3/UL (ref 0–1.3)
MONOCYTES NFR BLD AUTO: 7 %
NEUTROPHILS # BLD AUTO: 6.1 10E3/UL (ref 1.6–8.3)
NEUTROPHILS NFR BLD AUTO: 70 %
PLATELET # BLD AUTO: 350 10E3/UL (ref 150–450)
POTASSIUM SERPL-SCNC: 4.2 MMOL/L (ref 3.4–5.3)
PROT SERPL-MCNC: 7.4 G/DL (ref 6.4–8.3)
RBC # BLD AUTO: 5.18 10E6/UL (ref 4.4–5.9)
SODIUM SERPL-SCNC: 138 MMOL/L (ref 136–145)
WBC # BLD AUTO: 8.8 10E3/UL (ref 4–11)

## 2023-06-06 PROCEDURE — 36415 COLL VENOUS BLD VENIPUNCTURE: CPT

## 2023-06-06 PROCEDURE — 85025 COMPLETE CBC W/AUTO DIFF WBC: CPT

## 2023-06-06 PROCEDURE — 80053 COMPREHEN METABOLIC PANEL: CPT

## 2023-06-06 PROCEDURE — 84481 FREE ASSAY (FT-3): CPT

## 2023-06-06 PROCEDURE — 82728 ASSAY OF FERRITIN: CPT

## 2023-06-06 PROCEDURE — 83550 IRON BINDING TEST: CPT

## 2023-06-06 PROCEDURE — 83540 ASSAY OF IRON: CPT

## 2023-06-06 PROCEDURE — 85652 RBC SED RATE AUTOMATED: CPT

## 2023-06-06 NOTE — TELEPHONE ENCOUNTER
"Patient called back. Writer gave PCP message below. Patient states that he has the same feeling as before he went to ER. He states he will come to appointment on Monday but he wants \"lab tests to rule out virus' going around or anything seasonal that would make me feeling like fainting or dizziness?\"    Writer explained with dehydration, that those symptoms are feesible. Patient would still like to \"rule out\" anything else that would make his heart race and states he would like this to be discussed before Monday.     \"Maybe he could see what has been so far done and if there is anything else he feels is missing, can he order anything else.Tanner Olvera is a 52 year old male who presents today for basically need a plan, not waiting until Monday because they couldn't explain the lightheadedness and feeling of fainting?\"    Please advise.     Ghada Ibarra RN on 6/6/2023 at 9:46 AM        "

## 2023-06-06 NOTE — TELEPHONE ENCOUNTER
I placed order for him to consider doing before being seen if he wants.  Please inform him       thgx

## 2023-06-06 NOTE — TELEPHONE ENCOUNTER
1) Should patient schedule hospital follow up with Dr. Samuels. Patient states that he was seen at [facility] and requests Dr. Samuels review notes. VV or in person depending on what Dr. Samuels wants.   ---> VV is ok, which he already scheduled     2) Patient is on Eliquis and is asking how long he needs to take it?  --->He may need to continue, it should be discussed at the ER f/u visit     3) ER doctor wanted the patient to have a CT calcium. Patient is wondering if it is OK for him to have another CT scan so soon?  ---> CT coronary  And cardiology referral were placed by ED, please encourage him to schedule. The radiation load on the CT coronary scan only 9- 12 mSv. So, it doesn't affect a lot.    4) Patient was given referral from Dr. Samuels for skin lesion. Soonest the patient can be seen by dermatology is 1/2024 unless Dr. Samuels can order an urgent referral.  ---> new referral order is placed at a different encounter

## 2023-06-07 LAB — T3FREE SERPL-MCNC: 3.2 PG/ML (ref 2–4.4)

## 2023-06-12 ENCOUNTER — VIRTUAL VISIT (OUTPATIENT)
Dept: FAMILY MEDICINE | Facility: CLINIC | Age: 53
End: 2023-06-12
Payer: COMMERCIAL

## 2023-06-12 DIAGNOSIS — E78.5 HYPERLIPIDEMIA LDL GOAL <100: ICD-10-CM

## 2023-06-12 DIAGNOSIS — R00.2 PALPITATIONS: Primary | ICD-10-CM

## 2023-06-12 DIAGNOSIS — I26.92 ACUTE SADDLE PULMONARY EMBOLISM WITHOUT ACUTE COR PULMONALE (H): ICD-10-CM

## 2023-06-12 DIAGNOSIS — E11.9 DIABETES MELLITUS WITHOUT COMPLICATION (H): ICD-10-CM

## 2023-06-12 PROCEDURE — 99214 OFFICE O/P EST MOD 30 MIN: CPT | Mod: VID | Performed by: FAMILY MEDICINE

## 2023-06-12 NOTE — PROGRESS NOTES
Tanner is a 52 year old who is being evaluated via a billable video visit.      How would you like to obtain your AVS? MyChart  If the video visit is dropped, the invitation should be resent by: Text to cell phone: [phone]  Will anyone else be joining your video visit? No          Assessment & Plan     Palpitations  Has elevated T4 with lowering TSH, will recheck for further evaluation   - TSH; Future  - T4, free; Future    Acute saddle pulmonary embolism without acute cor pulmonale (H)  Improved, but still need DOAC due to femoral vein clots, will have him to recheck US around December for f/u   Encouraged him to continue on the DOAC    Hyperlipidemia LDL goal <100  Stable  Will review the lab and update  Pt   - Hemoglobin A1c; Future  - Lipid panel reflex to direct LDL Fasting; Future    Diabetes mellitus without complication (H)    - Hemoglobin A1c; Future  - TSH; Future  - T4, free; Future  - Lipid panel reflex to direct LDL Fasting; Future         MED REC REQUIRED  Post Medication Reconciliation Status: discharge medications reconciled, continue medications without change  FUTURE APPOINTMENTS:       - Follow-up office in 6 months     Vishal Samuels MD  [facility]   Tanner is a 52 year old, presenting for the following health issues:  ER F/U        6/12/2023     4:20 PM   Additional Questions   Roomed by Jese   Accompanied by N/A     [facility]       ED/UC Followup:    Facility:  [facility]      Date of visit: 06/03/2023  Reason for visit: Palpitations  Current Status:         Review of Systems   Constitutional, HEENT, cardiovascular, pulmonary, GI, , musculoskeletal, neuro, skin, endocrine and psych systems are negative, except as otherwise noted.      Objective           Vitals:  No vitals were obtained today due to virtual visit.    Physical Exam   GENERAL: Healthy, alert and no distress  EYES: Eyes grossly normal to inspection.  No discharge or  erythema, or obvious scleral/conjunctival abnormalities.  RESP: No audible wheeze, cough, or visible cyanosis.  No visible retractions or increased work of breathing.    SKIN: Visible skin clear. No significant rash, abnormal pigmentation or lesions.  NEURO: Cranial nerves grossly intact.  Mentation and speech appropriate for age.  PSYCH: Mentation appears normal, affect normal/bright, judgement and insight intact, normal speech and appearance well-groomed.                Video-Visit Details    Type of service:  Video Visit     Originating Location (pt. Location): Home    Distant Location (provider location):  On-site  Platform used for Video Visit: Inn[employer]    Video start: 5:00  Video finish: 5:31

## 2023-06-13 ENCOUNTER — LAB (OUTPATIENT)
Dept: LAB | Facility: CLINIC | Age: 53
End: 2023-06-13
Payer: COMMERCIAL

## 2023-06-13 DIAGNOSIS — E11.9 DIABETES MELLITUS WITHOUT COMPLICATION (H): ICD-10-CM

## 2023-06-13 DIAGNOSIS — R00.2 PALPITATIONS: ICD-10-CM

## 2023-06-13 DIAGNOSIS — E78.5 HYPERLIPIDEMIA LDL GOAL <100: ICD-10-CM

## 2023-06-13 LAB
CHOLEST SERPL-MCNC: 160 MG/DL
HBA1C MFR BLD: 6.3 % (ref 0–5.6)
HDLC SERPL-MCNC: 43 MG/DL
LDLC SERPL CALC-MCNC: 91 MG/DL
NONHDLC SERPL-MCNC: 117 MG/DL
T4 FREE SERPL-MCNC: 1.3 NG/DL (ref 0.9–1.7)
TRIGL SERPL-MCNC: 129 MG/DL
TSH SERPL DL<=0.005 MIU/L-ACNC: 5.55 UIU/ML (ref 0.3–4.2)

## 2023-06-13 PROCEDURE — 83036 HEMOGLOBIN GLYCOSYLATED A1C: CPT

## 2023-06-13 PROCEDURE — 84443 ASSAY THYROID STIM HORMONE: CPT

## 2023-06-13 PROCEDURE — 80061 LIPID PANEL: CPT

## 2023-06-13 PROCEDURE — 84439 ASSAY OF FREE THYROXINE: CPT

## 2023-06-13 PROCEDURE — 36415 COLL VENOUS BLD VENIPUNCTURE: CPT

## 2023-06-15 ENCOUNTER — TELEPHONE (OUTPATIENT)
Dept: OTOLARYNGOLOGY | Facility: CLINIC | Age: 53
End: 2023-06-15
Payer: COMMERCIAL

## 2023-06-15 NOTE — TELEPHONE ENCOUNTER
[facility] Call Center    Phone Message    May a detailed message be left on voicemail: yes     Reason for Call: Other: The pt is being referred for Vestibular dysfunction, unspecified laterality [H83.2X9]. The pt wants to be see at the [facility]. Please review his chart and contact the pt for scheduling. Thanks.     Action Taken: Message routed to:  [facility] ent    Travel Screening: Not Applicable

## 2023-08-29 DIAGNOSIS — I82.412 ACUTE DEEP VEIN THROMBOSIS (DVT) OF FEMORAL VEIN OF LEFT LOWER EXTREMITY (H): ICD-10-CM

## 2023-08-29 RX ORDER — APIXABAN 5 MG/1
TABLET, FILM COATED ORAL
Qty: 60 TABLET | Refills: 3 | Status: SHIPPED | OUTPATIENT
Start: 2023-08-29 | End: 2023-12-26

## 2023-09-13 DIAGNOSIS — E61.1 IRON DEFICIENCY: ICD-10-CM

## 2023-09-13 RX ORDER — FERROUS GLUCONATE 324(38)MG
TABLET ORAL
Qty: 90 TABLET | Refills: 2 | Status: SHIPPED | OUTPATIENT
Start: 2023-09-13 | End: 2023-12-07

## 2023-09-13 NOTE — TELEPHONE ENCOUNTER
Prescription approved per [facility] Refill Protocol.  Brenda Weinberg, RN  [facility] Triage Nurse

## 2023-10-18 ENCOUNTER — OFFICE VISIT (OUTPATIENT)
Dept: OTOLARYNGOLOGY | Facility: CLINIC | Age: 53
End: 2023-10-18
Payer: COMMERCIAL

## 2023-10-18 ENCOUNTER — OFFICE VISIT (OUTPATIENT)
Dept: AUDIOLOGY | Facility: CLINIC | Age: 53
End: 2023-10-18
Payer: COMMERCIAL

## 2023-10-18 VITALS
DIASTOLIC BLOOD PRESSURE: 83 MMHG | SYSTOLIC BLOOD PRESSURE: 129 MMHG | HEART RATE: 80 BPM | RESPIRATION RATE: 16 BRPM | OXYGEN SATURATION: 100 %

## 2023-10-18 DIAGNOSIS — R42 DIZZINESS: ICD-10-CM

## 2023-10-18 DIAGNOSIS — H90.3 SENSORINEURAL HEARING LOSS (SNHL) OF BOTH EARS: ICD-10-CM

## 2023-10-18 DIAGNOSIS — H90.6 MIXED HEARING LOSS, BILATERAL: ICD-10-CM

## 2023-10-18 DIAGNOSIS — R42 DIZZINESS: Primary | ICD-10-CM

## 2023-10-18 DIAGNOSIS — H90.3 SENSORINEURAL HEARING LOSS, ASYMMETRICAL: Primary | ICD-10-CM

## 2023-10-18 PROCEDURE — 99203 OFFICE O/P NEW LOW 30 MIN: CPT | Performed by: OTOLARYNGOLOGY

## 2023-10-18 PROCEDURE — 92550 TYMPANOMETRY & REFLEX THRESH: CPT | Performed by: AUDIOLOGIST

## 2023-10-18 PROCEDURE — 92557 COMPREHENSIVE HEARING TEST: CPT | Performed by: AUDIOLOGIST

## 2023-10-18 NOTE — PROGRESS NOTES
AUDIOLOGY REPORT:    Patient was referred from ENT by Toni Landrum MD for audiology evaluation. Patient reports several bouts of severe dizziness accompanied by head pressure several months ago but not recently. He also reports occasional clicking sounds in his right ear only.  There is no history of familial hearing loss or noise exposure.    Testing:    Otoscopy:   Otoscopic exam indicates ears are clear of cerumen bilaterally     Tympanograms:    RIGHT: hypernormal eardrum mobility     LEFT:   hypernormal eardrum mobility    Reflexes (reported by stimulus ear):  RIGHT: Ipsilateral is present at normal levels  RIGHT: Contralateral is absent at frequencies tested  LEFT:   Ipsilateral is absent at frequencies tested  LEFT:   Contralateral is present at normal levels    Thresholds:   Pure Tone Thresholds assessed using conventional audiometry with good reliability from 250-8000 Hz bilaterally using insert earphones and circumaural headphones      RIGHT:  normal from 250-750 Hz sloping to borderline-normal mixed hearing loss (predominantly sensorineural but with 20 dB air bone gap at 4000 Hz)    LEFT:    normal from 250-1000 Hz sloping to borderline-normal mixed hearing loss (predominantly sensorineural but with 20 dB air bone gap at 4000 Hz)    Speech Reception Threshold:    RIGHT: 15 dB HL    LEFT:   15 dB HL  Speech Reception Thresholds are in good agreement with pure tone thresholds.    Word Recognition Score:     RIGHT: 100% at 55 dB HL using NU-6 recorded word list.    LEFT:   100% at 55 dB HL using NU-6 recorded word list.    Discussed results with the patient.     Patient was returned to ENT for follow up.     Mehdi Lombardi MA, CCC-A  Licensed Audiologist #[id number]  10/18/2023

## 2023-10-18 NOTE — PROGRESS NOTES
I am seeing this patient in consultation for vestibular dysfunction at the request of the provider Vishal Jacobson.    Chief Complaint - Dizziness    History of Present Illness - Tanner Olvera is a 53 year old male who presents with dizziness. He has had a couple spells where he feels dizzy. He has had imbalance issues. This has been since June. A year ago he had DVT following knee surgery that led to pulmonary embolism. During these two episodes he describes this as not being vertigo. He feels head feels warm, ears get flush/hot like someone squeezed ears, and then he has imbalance (feels falling). The episodes last for 2 hours. He had his heart checked and it was okay. He has had vertigo many years ago and says it isn't that. The episodes were June and July. Normally he doesn't have balance problems. He is on eliquis. No headache. No tinnitus. Occasionally he feels ears have a clicking, 1-2 times ever in right side. Sometimes feels ear pressure. He pops ears and it is okay.     Tests personally reviewed today for this visit:   1.) audiogram was performed today as part of the evaluation and personally reviewed. The audiogram showed a mostly symmetric mid to high frequency sensorineural hearing loss (mild), air-bone gap at 4k Hz.    2.) tympanograms were performed today and were Type Ad curves, with normal canal volume and middle ear pressure.    3.) Hgb A1C of 6.3 was 6/13/23    Past Medical History -   Patient Active Problem List   Diagnosis    Hyperlipidemia LDL goal <100    Family history of early CAD    Degenerative disc disease    Vitamin D deficiency    Abnormal thyroid blood test    Diabetes mellitus without complication (H)    Acute saddle pulmonary embolism without acute cor pulmonale (H)    Acute deep vein thrombosis (DVT) of femoral vein of left lower extremity (H)    Left patella fracture       Current Medications -   Current Outpatient Medications:     apixaban ANTICOAGULANT (ELIQUIS) 5 MG tablet,  Take 5 mg by mouth (Patient not taking: Reported on 6/12/2023), Disp: , Rfl:     ELIQUIS ANTICOAGULANT 5 MG tablet, TAKE 1 TABLET BY MOUTH TWICE DAILY, Disp: 60 tablet, Rfl: 3    ferrous gluconate (FERGON) 324 (38 Fe) MG tablet, TAKE 1 TABLET(324 MG) BY MOUTH DAILY WITH BREAKFAST, Disp: 90 tablet, Rfl: 2    Multiple Vitamin (MULTI-VITAMINS) TABS, Take 1 tablet by mouth daily, Disp: , Rfl:     Multiple Vitamins-Minerals (MULTIVITAMIN ADULTS PO), , Disp: , Rfl:     STATIN NOT PRESCRIBED (INTENTIONAL), Please choose reason not prescribed from choices below. (Patient not taking: Reported on 1/14/2022), Disp: , Rfl:     Vitamin D, Cholecalciferol, 25 MCG (1000 UT) TABS, Take 2,000 Units by mouth, Disp: , Rfl:     Allergies -   Allergies   Allergen Reactions    Penicillins Rash       Social History -   Social History     Socioeconomic History    Marital status:     Number of children: 1    Years of education: 18   Occupational History    Occupation:      Employer: [employer]   Tobacco Use    Smoking status: Never    Smokeless tobacco: Never    Tobacco comments:     Non Smoker   Vaping Use    Vaping Use: Never used   Substance and Sexual Activity    Alcohol use: No     Comment: nothing    Drug use: No    Sexual activity: Not Currently     Partners: Female     Birth control/protection: None   Other Topics Concern    Parent/sibling w/ CABG, MI or angioplasty before 65F 55M? No     Service No    Blood Transfusions No    Exercise No    Seat Belt Yes   Social History Narrative    Vegetarian. Calcium, vitamin D and vitamin B12 supplements recommended.        Family History -   Family History   Problem Relation Age of Onset    C.A.D. Father     Asthma No family hx of     Diabetes No family hx of     Hypertension No family hx of     Cancer - colorectal No family hx of     Prostate Cancer No family hx of     Anesthesia Reaction No family hx of     Blood Disease No family hx of     Eye Disorder No  family hx of     Osteoporosis No family hx of     Thyroid Disease No family hx of     Glaucoma No family hx of     Macular Degeneration No family hx of        Review of Systems - As per HPI and PMHx, otherwise 7 system review of the head and neck is negative.    Physical Exam  General - The patient is in no distress.  Alert, answers questions and cooperates with examination appropriately.   Voice and Breathing - The patient was breathing comfortably without the use of accessory muscles. There was no wheezing, stridor, or stertor.  The patients voice was clear and strong, with no dysphonia.    Eyes - Extraocular movements intact. Sclera were not icteric or injected, conjunctiva were pink and moist.   Ears - The auricles appeared normal. The external auditory canals were nonedematous and nonerythematous. The tympanic membranes are normal in appearance, bony landmarks are intact.  No retraction, perforation, or masses.  No fluid or purulence was seen in the external canal or the middle ear.   Neurologic - Cranial nerves II-XII are grossly intact. Specifically, the facial nerve is intact, House-Brackmann grade 1 of 6. Finger-nose-finger is normal. Normal Romberg. Normal gait.    Neck -  Soft, nontender. Palpation of the occipital, submental, submandibular, internal jugular chain, and supraclavicular nodes did not demonstrate any abnormal lymph nodes or masses. The parotid glands were without masses. Palpation of the thyroid was soft and smooth, with no nodules or goiter appreciated.  The trachea was midline.        A/P -     ICD-10-CM    1. Dizziness  R42 Adult Audiology  Referral      2. Sensorineural hearing loss (SNHL) of both ears  H90.3 Adult Audiology  Referral            Tanner Olvera is a 53 year old male with two episodes that involve the following 3 symptoms and last a few hours: hot head and ears and imbalance like he can fall. No vertigo. He hasn't had an episode in a few months. This  seems most likely NOT an inner ear problem. It seems more like migraine. If this happens again he can see his primary, UC, the ER, or neurology, but he doesn't need to see me as this isn't an ear issue. Acute vestibular issues cause vertigo. Chronic vestibular issues can cause imbalance, but then the imbalance lasts and isn't episodic.     He has mild sensorineural hearing loss. Recheck hearing in 1 year.          Lyndon Landrum MD  Otolaryngology  [facility]

## 2023-10-18 NOTE — LETTER
10/18/2023         RE: Tanner Olvera  [address]  [facility] [address]        Dear Colleague,    Thank you for referring your patient, Tanner Olvera, to the [facility]. Please see a copy of my visit note below.    I am seeing this patient in consultation for vestibular dysfunction at the request of the provider Vishal Jacobson.    Chief Complaint - Dizziness    History of Present Illness - Tanner Olvera is a 53 year old male who presents with dizziness. He has had a couple spells where he feels dizzy. He has had imbalance issues. This has been since June. A year ago he had DVT following knee surgery that led to pulmonary embolism. During these two episodes he describes this as not being vertigo. He feels head feels warm, ears get flush/hot like someone squeezed ears, and then he has imbalance (feels falling). The episodes last for 2 hours. He had his heart checked and it was okay. He has had vertigo many years ago and says it isn't that. The episodes were June and July. Normally he doesn't have balance problems. He is on eliquis. No headache. No tinnitus. Occasionally he feels ears have a clicking, 1-2 times ever in right side. Sometimes feels ear pressure. He pops ears and it is okay.     Tests personally reviewed today for this visit:   1.) audiogram was performed today as part of the evaluation and personally reviewed. The audiogram showed a mostly symmetric mid to high frequency sensorineural hearing loss (mild), air-bone gap at 4k Hz.    2.) tympanograms were performed today and were Type Ad curves, with normal canal volume and middle ear pressure.    3.) Hgb A1C of 6.3 was 6/13/23    Past Medical History -   Patient Active Problem List   Diagnosis     Hyperlipidemia LDL goal <100     Family history of early CAD     Degenerative disc disease     Vitamin D deficiency     Abnormal thyroid blood test     Diabetes mellitus without complication (H)     Acute saddle  pulmonary embolism without acute cor pulmonale (H)     Acute deep vein thrombosis (DVT) of femoral vein of left lower extremity (H)     Left patella fracture       Current Medications -   Current Outpatient Medications:      apixaban ANTICOAGULANT (ELIQUIS) 5 MG tablet, Take 5 mg by mouth (Patient not taking: Reported on 6/12/2023), Disp: , Rfl:      ELIQUIS ANTICOAGULANT 5 MG tablet, TAKE 1 TABLET BY MOUTH TWICE DAILY, Disp: 60 tablet, Rfl: 3     ferrous gluconate (FERGON) 324 (38 Fe) MG tablet, TAKE 1 TABLET(324 MG) BY MOUTH DAILY WITH BREAKFAST, Disp: 90 tablet, Rfl: 2     Multiple Vitamin (MULTI-VITAMINS) TABS, Take 1 tablet by mouth daily, Disp: , Rfl:      Multiple Vitamins-Minerals (MULTIVITAMIN ADULTS PO), , Disp: , Rfl:      STATIN NOT PRESCRIBED (INTENTIONAL), Please choose reason not prescribed from choices below. (Patient not taking: Reported on 1/14/2022), Disp: , Rfl:      Vitamin D, Cholecalciferol, 25 MCG (1000 UT) TABS, Take 2,000 Units by mouth, Disp: , Rfl:     Allergies -   Allergies   Allergen Reactions     Penicillins Rash       Social History -   Social History     Socioeconomic History     Marital status:      Number of children: 1     Years of education: 18   Occupational History     Occupation:      Employer: [employer]   Tobacco Use     Smoking status: Never     Smokeless tobacco: Never     Tobacco comments:     Non Smoker   Vaping Use     Vaping Use: Never used   Substance and Sexual Activity     Alcohol use: No     Comment: nothing     Drug use: No     Sexual activity: Not Currently     Partners: Female     Birth control/protection: None   Other Topics Concern     Parent/sibling w/ CABG, MI or angioplasty before 65F 55M? No      Service No     Blood Transfusions No     Exercise No     Seat Belt Yes   Social History Narrative    Vegetarian. Calcium, vitamin D and vitamin B12 supplements recommended.        Family History -   Family History   Problem  Relation Age of Onset     C.A.D. Father      Asthma No family hx of      Diabetes No family hx of      Hypertension No family hx of      Cancer - colorectal No family hx of      Prostate Cancer No family hx of      Anesthesia Reaction No family hx of      Blood Disease No family hx of      Eye Disorder No family hx of      Osteoporosis No family hx of      Thyroid Disease No family hx of      Glaucoma No family hx of      Macular Degeneration No family hx of        Review of Systems - As per HPI and PMHx, otherwise 7 system review of the head and neck is negative.    Physical Exam  General - The patient is in no distress.  Alert, answers questions and cooperates with examination appropriately.   Voice and Breathing - The patient was breathing comfortably without the use of accessory muscles. There was no wheezing, stridor, or stertor.  The patients voice was clear and strong, with no dysphonia.    Eyes - Extraocular movements intact. Sclera were not icteric or injected, conjunctiva were pink and moist.   Ears - The auricles appeared normal. The external auditory canals were nonedematous and nonerythematous. The tympanic membranes are normal in appearance, bony landmarks are intact.  No retraction, perforation, or masses.  No fluid or purulence was seen in the external canal or the middle ear.   Neurologic - Cranial nerves II-XII are grossly intact. Specifically, the facial nerve is intact, House-Brackmann grade 1 of 6. Finger-nose-finger is normal. Normal Romberg. Normal gait.    Neck -  Soft, nontender. Palpation of the occipital, submental, submandibular, internal jugular chain, and supraclavicular nodes did not demonstrate any abnormal lymph nodes or masses. The parotid glands were without masses. Palpation of the thyroid was soft and smooth, with no nodules or goiter appreciated.  The trachea was midline.        A/P -     ICD-10-CM    1. Dizziness  R42 Adult Audiology  Referral      2. Sensorineural  hearing loss (SNHL) of both ears  H90.3 Adult Audiology [facility] Referral            Tanner Olvera is a 53 year old male with two episodes that involve the following 3 symptoms and last a few hours: hot head and ears and imbalance like he can fall. No vertigo. He hasn't had an episode in a few months. This seems most likely NOT an inner ear problem. It seems more like migraine. If this happens again he can see his primary, UC, the ER, or neurology, but he doesn't need to see me as this isn't an ear issue. Acute vestibular issues cause vertigo. Chronic vestibular issues can cause imbalance, but then the imbalance lasts and isn't episodic.     He has mild sensorineural hearing loss. Recheck hearing in 1 year.          Lyndon Landrum MD  Otolaryngology  [facility]        Again, thank you for allowing me to participate in the care of your patient.        Sincerely,        Lyndon Landrum MD

## 2023-12-07 ENCOUNTER — IMMUNIZATION (OUTPATIENT)
Dept: FAMILY MEDICINE | Facility: CLINIC | Age: 53
End: 2023-12-07
Payer: COMMERCIAL

## 2023-12-07 DIAGNOSIS — Z23 ENCOUNTER FOR IMMUNIZATION: Primary | ICD-10-CM

## 2023-12-07 PROCEDURE — 99207 PR NO CHARGE NURSE ONLY: CPT

## 2023-12-07 PROCEDURE — 91320 SARSCV2 VAC 30MCG TRS-SUC IM: CPT

## 2023-12-07 PROCEDURE — 90686 IIV4 VACC NO PRSV 0.5 ML IM: CPT

## 2023-12-07 PROCEDURE — 90480 ADMN SARSCOV2 VAC 1/ONLY CMP: CPT

## 2023-12-07 PROCEDURE — 90471 IMMUNIZATION ADMIN: CPT

## 2023-12-07 NOTE — PROGRESS NOTES
Prior to immunization administration, verified patients identity using patient s name and date of birth. Please see Immunization Activity for additional information.     Screening Questionnaire for Adult Immunization    Are you sick today?   No   Do you have allergies to medications, food, a vaccine component or latex?   Yes   Have you ever had a serious reaction after receiving a vaccination?   No   Do you have a long-term health problem with heart, lung, kidney, or metabolic disease (e.g., diabetes), asthma, a blood disorder, no spleen, complement component deficiency, a cochlear implant, or a spinal fluid leak?  Are you on long-term aspirin therapy?   No   Do you have cancer, leukemia, HIV/AIDS, or any other immune system problem?   No   Do you have a parent, brother, or sister with an immune system problem?   No   In the past 3 months, have you taken medications that affect  your immune system, such as prednisone, other steroids, or anticancer drugs; drugs for the treatment of rheumatoid arthritis, Crohn s disease, or psoriasis; or have you had radiation treatments?   No   Have you had a seizure, or a brain or other nervous system problem?   No   During the past year, have you received a transfusion of blood or blood    products, or been given immune (gamma) globulin or antiviral drug?   No   For women: Are you pregnant or is there a chance you could become       pregnant during the next month?   No   Have you received any vaccinations in the past 4 weeks?   No     Immunization questionnaire answers were all negative.    I have reviewed the following standing orders:   This patient is due and qualifies for the Covid-19 vaccine.     Click here for COVID-19 Standing Order    I have reviewed the vaccines inclusion and exclusion criteria; No concerns regarding eligibility.     This patient is due and qualifies for the Influenza vaccine.    Click here for Influenza Vaccine Standing Order    I have reviewed the  vaccines inclusion and exclusion criteria; No concerns regarding eligibility.     Patient instructed to remain in clinic for 15 minutes afterwards, and to report any adverse reactions.

## 2023-12-25 DIAGNOSIS — I82.412 ACUTE DEEP VEIN THROMBOSIS (DVT) OF FEMORAL VEIN OF LEFT LOWER EXTREMITY (H): ICD-10-CM

## 2023-12-26 RX ORDER — APIXABAN 5 MG/1
TABLET, FILM COATED ORAL
Qty: 60 TABLET | Refills: 3 | Status: SHIPPED | OUTPATIENT
Start: 2023-12-26 | End: 2024-04-17

## 2024-01-22 ENCOUNTER — OFFICE VISIT (OUTPATIENT)
Dept: FAMILY MEDICINE | Facility: CLINIC | Age: 54
End: 2024-01-22
Payer: COMMERCIAL

## 2024-01-22 VITALS
HEART RATE: 80 BPM | DIASTOLIC BLOOD PRESSURE: 82 MMHG | RESPIRATION RATE: 18 BRPM | SYSTOLIC BLOOD PRESSURE: 138 MMHG | WEIGHT: 132.6 LBS | TEMPERATURE: 97.9 F | OXYGEN SATURATION: 98 % | HEIGHT: 61 IN | BODY MASS INDEX: 25.04 KG/M2

## 2024-01-22 DIAGNOSIS — I82.412 ACUTE DEEP VEIN THROMBOSIS (DVT) OF FEMORAL VEIN OF LEFT LOWER EXTREMITY (H): ICD-10-CM

## 2024-01-22 DIAGNOSIS — Z12.5 SCREENING FOR PROSTATE CANCER: ICD-10-CM

## 2024-01-22 DIAGNOSIS — Z12.11 SCREEN FOR COLON CANCER: ICD-10-CM

## 2024-01-22 DIAGNOSIS — Z00.00 HEALTH MAINTENANCE EXAMINATION: Primary | ICD-10-CM

## 2024-01-22 DIAGNOSIS — E11.9 DIABETES MELLITUS WITHOUT COMPLICATION (H): ICD-10-CM

## 2024-01-22 LAB
ERYTHROCYTE [DISTWIDTH] IN BLOOD BY AUTOMATED COUNT: 12.8 % (ref 10–15)
HBA1C MFR BLD: 6.7 % (ref 0–5.6)
HCT VFR BLD AUTO: 44.1 % (ref 40–53)
HGB BLD-MCNC: 14.3 G/DL (ref 13.3–17.7)
MCH RBC QN AUTO: 27.9 PG (ref 26.5–33)
MCHC RBC AUTO-ENTMCNC: 32.4 G/DL (ref 31.5–36.5)
MCV RBC AUTO: 86 FL (ref 78–100)
PLATELET # BLD AUTO: 296 10E3/UL (ref 150–450)
RBC # BLD AUTO: 5.12 10E6/UL (ref 4.4–5.9)
WBC # BLD AUTO: 8.2 10E3/UL (ref 4–11)

## 2024-01-22 PROCEDURE — 85027 COMPLETE CBC AUTOMATED: CPT | Performed by: FAMILY MEDICINE

## 2024-01-22 PROCEDURE — 83036 HEMOGLOBIN GLYCOSYLATED A1C: CPT | Performed by: FAMILY MEDICINE

## 2024-01-22 PROCEDURE — 99213 OFFICE O/P EST LOW 20 MIN: CPT | Mod: 25 | Performed by: FAMILY MEDICINE

## 2024-01-22 PROCEDURE — 36415 COLL VENOUS BLD VENIPUNCTURE: CPT | Performed by: FAMILY MEDICINE

## 2024-01-22 PROCEDURE — 90471 IMMUNIZATION ADMIN: CPT | Performed by: FAMILY MEDICINE

## 2024-01-22 PROCEDURE — G0103 PSA SCREENING: HCPCS | Performed by: FAMILY MEDICINE

## 2024-01-22 PROCEDURE — 90677 PCV20 VACCINE IM: CPT | Performed by: FAMILY MEDICINE

## 2024-01-22 PROCEDURE — 82570 ASSAY OF URINE CREATININE: CPT | Performed by: FAMILY MEDICINE

## 2024-01-22 PROCEDURE — 99396 PREV VISIT EST AGE 40-64: CPT | Mod: 25 | Performed by: FAMILY MEDICINE

## 2024-01-22 PROCEDURE — 80053 COMPREHEN METABOLIC PANEL: CPT | Performed by: FAMILY MEDICINE

## 2024-01-22 PROCEDURE — 84439 ASSAY OF FREE THYROXINE: CPT | Performed by: FAMILY MEDICINE

## 2024-01-22 PROCEDURE — 99207 PR FOOT EXAM NO CHARGE: CPT | Performed by: FAMILY MEDICINE

## 2024-01-22 PROCEDURE — 84443 ASSAY THYROID STIM HORMONE: CPT | Performed by: FAMILY MEDICINE

## 2024-01-22 PROCEDURE — 82043 UR ALBUMIN QUANTITATIVE: CPT | Performed by: FAMILY MEDICINE

## 2024-01-22 ASSESSMENT — ENCOUNTER SYMPTOMS
DYSURIA: 0
CHILLS: 0
ABDOMINAL PAIN: 0
SHORTNESS OF BREATH: 0
DIZZINESS: 0
FEVER: 0
FREQUENCY: 0
WEAKNESS: 0
CONSTIPATION: 0
HEADACHES: 0
DIARRHEA: 0
ARTHRALGIAS: 0
EYE PAIN: 0
HEMATURIA: 0
PALPITATIONS: 0
JOINT SWELLING: 0
MYALGIAS: 0
COUGH: 0
SORE THROAT: 0
NERVOUS/ANXIOUS: 0
HEARTBURN: 0
PARESTHESIAS: 0
HEMATOCHEZIA: 0
NAUSEA: 0

## 2024-01-22 NOTE — PROGRESS NOTES
Preventive Care Visit  [facility] LANA Samuels MD, Family Medicine  Jan 22, 2024      SUBJECTIVE:   Tanner is a 53 year old, presenting for the following:  Physical        1/22/2024     4:08 PM   Additional Questions   Roomed by Janki LAWRENCE     Healthy Habits:     Getting at least 3 servings of Calcium per day:  Yes    Bi-annual eye exam:  Yes    Dental care twice a year:  Yes    Sleep apnea or symptoms of sleep apnea:  Excessive snoring    Diet:  Vegetarian/vegan    Frequency of exercise:  2-3 days/week    Duration of exercise:  15-30 minutes    Taking medications regularly:  Yes    Medication side effects:  None    Additional concerns today:  Yes      Today's PHQ-2 Score:       1/22/2024     2:10 PM   PHQ-2 ( 1999 Pf[employer])   Q1: Little interest or pleasure in doing things 0   Q2: Feeling down, depressed or hopeless 0   PHQ-2 Score 0   Q1: Little interest or pleasure in doing things Not at all   Q2: Feeling down, depressed or hopeless Not at all   PHQ-2 Score 0         Social History     Tobacco Use    Smoking status: Never    Smokeless tobacco: Never    Tobacco comments:     Non Smoker   Substance Use Topics    Alcohol use: No     Comment: nothing             1/22/2024     2:09 PM   Alcohol Use   Prescreen: >3 drinks/day or >7 drinks/week? Not Applicable          No data to display                Last PSA:   Prostate Specific Antigen Screen   Date Value Ref Range Status   03/22/2023 0.91 0.00 - 3.50 ng/mL Final       Reviewed orders with patient. Reviewed health maintenance and updated orders accordingly - Yes  BP Readings from Last 3 Encounters:   01/22/24 138/82   10/18/23 129/83   05/12/23 110/84    Wt Readings from Last 3 Encounters:   01/22/24 60.1 kg (132 lb 9.6 oz)   05/12/23 56.7 kg (125 lb)   12/09/21 58.1 kg (128 lb)                  Patient Active Problem List   Diagnosis    Hyperlipidemia LDL goal <100    Family history of early CAD    Degenerative disc disease    Vitamin D  deficiency    Abnormal thyroid blood test    Diabetes mellitus without complication (H)    Acute saddle pulmonary embolism without acute cor pulmonale (H)    Acute deep vein thrombosis (DVT) of femoral vein of left lower extremity (H)    Left patella fracture     Past Surgical History:   Procedure Laterality Date    NO HISTORY OF SURGERY         Social History     Tobacco Use    Smoking status: Never    Smokeless tobacco: Never    Tobacco comments:     Non Smoker   Substance Use Topics    Alcohol use: No     Comment: nothing     Family History   Problem Relation Age of Onset    C.A.D. Father     Asthma No family hx of     Diabetes No family hx of     Hypertension No family hx of     Cancer - colorectal No family hx of     Prostate Cancer No family hx of     Anesthesia Reaction No family hx of     Blood Disease No family hx of     Eye Disorder No family hx of     Osteoporosis No family hx of     Thyroid Disease No family hx of     Glaucoma No family hx of     Macular Degeneration No family hx of          Current Outpatient Medications   Medication Sig Dispense Refill    apixaban ANTICOAGULANT (ELIQUIS) 5 MG tablet Take 5 mg by mouth (Patient not taking: Reported on 6/12/2023)      ELIQUIS ANTICOAGULANT 5 MG tablet TAKE 1 TABLET BY MOUTH TWICE DAILY 60 tablet 3    Multiple Vitamin (MULTI-VITAMINS) TABS Take 1 tablet by mouth daily      Vitamin D, Cholecalciferol, 25 MCG (1000 UT) TABS Take 2,000 Units by mouth       Allergies   Allergen Reactions    Penicillins Rash     Recent Labs   Lab Test 06/13/23  0706 06/06/23  1504 03/22/23  1301 12/09/21  1205 11/01/21  1449 12/23/20  1520 12/11/19  0956   A1C 6.3*  --  6.3* 6.4*   < > 6.3* 6.5*   LDL 91  --  124* 145*  --  151* 134*   HDL 43  --  50 46  --  41 36*   TRIG 129  --  131 103  --  149 179*   ALT  --  27 41 83*   < > 85* 73*   CR  --  0.71 0.82 0.83   < > 0.84 0.81   GFRESTIMATED  --  >90 >90 >90   < > >90 >90   GFRESTBLACK  --   --   --   --   --  >90 >90  "  POTASSIUM  --  4.2 4.6 4.0   < > 4.3 4.4   TSH 5.55*  --  4.39* 3.65  --  5.39* 4.44*    < > = values in this interval not displayed.        Reviewed and updated as needed this visit by clinical staff   Tobacco  Allergies               Reviewed and updated as needed this visit by Provider                  Past Medical History:   Diagnosis Date    Chest pain 12/7/2010    Dandruff 12/7/2010    Degenerative disc disease 1/1/2003    Hyperlipidemia LDL goal <100 9/2/2011    Left hemiparesis (H) 12/22/2016    LFTs abnormal 6/3/2011     (Problem list name updated by automated process. Provider to review and confirm.)    Sensory hearing loss, bilateral 1/11    Skin tags 12/7/2010    Type 2 diabetes, HbA1c goal < 7% (H) 9/2/2011    Vertigo 12/7/2010      Past Surgical History:   Procedure Laterality Date    NO HISTORY OF SURGERY       Review of Systems   Constitutional:  Negative for chills and fever.   HENT:  Negative for congestion, ear pain, hearing loss and sore throat.    Eyes:  Negative for pain and visual disturbance.   Respiratory:  Negative for cough and shortness of breath.    Cardiovascular:  Negative for chest pain and palpitations.   Gastrointestinal:  Negative for abdominal pain, constipation, diarrhea and nausea.   Genitourinary:  Negative for dysuria, frequency, genital sores, hematuria, penile discharge and urgency.   Musculoskeletal:  Negative for arthralgias, joint swelling and myalgias.   Skin:  Negative for rash.   Neurological:  Negative for dizziness, weakness and headaches.   Psychiatric/Behavioral:  The patient is not nervous/anxious.          OBJECTIVE:   /82 (BP Location: Right arm, Patient Position: Sitting, Cuff Size: Adult Regular)   Pulse 80   Temp 97.9  F (36.6  C) (Oral)   Resp 18   Ht 1.556 m (5' 1.25\")   Wt 60.1 kg (132 lb 9.6 oz)   SpO2 98%   BMI 24.85 kg/m     Estimated body mass index is 24.85 kg/m  as calculated from the following:    Height as of this encounter: 1.556 " "m (5' 1.25\").    Weight as of this encounter: 60.1 kg (132 lb 9.6 oz).  Physical Exam  GENERAL: alert and no distress  EYES: Eyes grossly normal to inspection, PERRL and conjunctivae and sclerae normal  HENT: ear canals and TM's normal, nose and mouth without ulcers or lesions  NECK: no adenopathy, no asymmetry, masses, or scars  RESP: lungs clear to auscultation - no rales, rhonchi or wheezes  CV: regular rate and rhythm, normal S1 S2, no S3 or S4, no murmur, click or rub, no peripheral edema  ABDOMEN: soft, nontender, no hepatosplenomegaly, no masses and bowel sounds normal  MS: no gross musculoskeletal defects noted, no edema  SKIN: no suspicious lesions or rashes  NEURO: Normal strength and tone, mentation intact and speech normal  BACK: no CVA tenderness, no paralumbar tenderness  PSYCH: mentation appears normal, affect normal/bright  LYMPH: no cervical, supraclavicular, axillary, or inguinal adenopathy  Diabetic foot exam: normal DP and PT pulses, no trophic changes or ulcerative lesions, and normal sensory exam        ASSESSMENT/PLAN:   Screen for colon cancer  Will discuss after LE US done to fine if DVT cleared     Diabetes mellitus without complication (H)  Has been stable  Will review the lab and update pt  - HEMOGLOBIN A1C; Future  - Albumin Random Urine Quantitative with Creat Ratio; Future  - TSH with free T4 reflex; Future  - FOOT EXAM  - Adult Eye  Referral; Future    Health maintenance examination    - HEMOGLOBIN A1C; Future  - CBC with platelets; Future  - Comprehensive metabolic panel (BMP + Alb, Alk Phos, ALT, AST, Total. Bili, TP); Future  - Albumin Random Urine Quantitative with Creat Ratio; Future  - TSH with free T4 reflex; Future  - FOOT EXAM    Screening for prostate cancer    - PSA, screen; Future    Acute deep vein thrombosis (DVT) of femoral vein of left lower extremity (H)  Will recheck on US for follow up evaluation and decide weaning off of DOAC  - US Lower Extremity Venous " Duplex Left; Future    Patient has been advised of split billing requirements and indicates understanding: Yes      Counseling  Reviewed preventive health counseling, as reflected in patient instructions        He reports that he has never smoked. He has never used smokeless tobacco.            Signed Electronically by: Vishal Samuels MD  Answers submitted by the patient for this visit:  Annual Preventive Visit (Submitted on 1/22/2024)  Chief Complaint: Annual Exam:  Blood in stool: No  heartburn: No  peripheral edema: No  mood changes: No  Skin sensation changes: No  impotence: No

## 2024-01-23 LAB
ALBUMIN SERPL BCG-MCNC: 4.6 G/DL (ref 3.5–5.2)
ALP SERPL-CCNC: 95 U/L (ref 40–150)
ALT SERPL W P-5'-P-CCNC: 58 U/L (ref 0–70)
ANION GAP SERPL CALCULATED.3IONS-SCNC: 10 MMOL/L (ref 7–15)
AST SERPL W P-5'-P-CCNC: 40 U/L (ref 0–45)
BILIRUB SERPL-MCNC: 0.2 MG/DL
BUN SERPL-MCNC: 8.3 MG/DL (ref 6–20)
CALCIUM SERPL-MCNC: 9.4 MG/DL (ref 8.6–10)
CHLORIDE SERPL-SCNC: 103 MMOL/L (ref 98–107)
CREAT SERPL-MCNC: 0.76 MG/DL (ref 0.67–1.17)
CREAT UR-MCNC: 85.5 MG/DL
DEPRECATED HCO3 PLAS-SCNC: 27 MMOL/L (ref 22–29)
EGFRCR SERPLBLD CKD-EPI 2021: >90 ML/MIN/1.73M2
GLUCOSE SERPL-MCNC: 106 MG/DL (ref 70–99)
MICROALBUMIN UR-MCNC: <12 MG/L
MICROALBUMIN/CREAT UR: NORMAL MG/G{CREAT}
POTASSIUM SERPL-SCNC: 4.1 MMOL/L (ref 3.4–5.3)
PROT SERPL-MCNC: 7.3 G/DL (ref 6.4–8.3)
PSA SERPL DL<=0.01 NG/ML-MCNC: 0.86 NG/ML (ref 0–3.5)
SODIUM SERPL-SCNC: 140 MMOL/L (ref 135–145)
T4 FREE SERPL-MCNC: 1.26 NG/DL (ref 0.9–1.7)
TSH SERPL DL<=0.005 MIU/L-ACNC: 4.39 UIU/ML (ref 0.3–4.2)

## 2024-01-29 ENCOUNTER — ANCILLARY PROCEDURE (OUTPATIENT)
Dept: ULTRASOUND IMAGING | Facility: CLINIC | Age: 54
End: 2024-01-29
Attending: FAMILY MEDICINE
Payer: COMMERCIAL

## 2024-01-29 DIAGNOSIS — I82.412 ACUTE DEEP VEIN THROMBOSIS (DVT) OF FEMORAL VEIN OF LEFT LOWER EXTREMITY (H): ICD-10-CM

## 2024-01-29 PROCEDURE — 93971 EXTREMITY STUDY: CPT | Mod: LT | Performed by: STUDENT IN AN ORGANIZED HEALTH CARE EDUCATION/TRAINING PROGRAM

## 2024-01-30 ENCOUNTER — LAB (OUTPATIENT)
Dept: LAB | Facility: CLINIC | Age: 54
End: 2024-01-30
Payer: COMMERCIAL

## 2024-01-30 DIAGNOSIS — Z00.00 HEALTH MAINTENANCE EXAMINATION: ICD-10-CM

## 2024-01-30 DIAGNOSIS — E11.9 DIABETES MELLITUS WITHOUT COMPLICATION (H): ICD-10-CM

## 2024-01-30 LAB
CHOLEST SERPL-MCNC: 206 MG/DL
FASTING STATUS PATIENT QL REPORTED: YES
HDLC SERPL-MCNC: 35 MG/DL
LDLC SERPL CALC-MCNC: 137 MG/DL
NONHDLC SERPL-MCNC: 171 MG/DL
TRIGL SERPL-MCNC: 168 MG/DL

## 2024-01-30 PROCEDURE — 80061 LIPID PANEL: CPT

## 2024-01-30 PROCEDURE — 36415 COLL VENOUS BLD VENIPUNCTURE: CPT

## 2024-02-01 ENCOUNTER — LAB (OUTPATIENT)
Dept: LAB | Facility: CLINIC | Age: 54
End: 2024-02-01
Payer: COMMERCIAL

## 2024-02-01 DIAGNOSIS — R79.89 LOW VITAMIN D LEVEL: ICD-10-CM

## 2024-02-01 DIAGNOSIS — E61.1 IRON DEFICIENCY: ICD-10-CM

## 2024-02-01 LAB
IRON BINDING CAPACITY (ROCHE): 336 UG/DL (ref 240–430)
IRON SATN MFR SERPL: 24 % (ref 15–46)
IRON SERPL-MCNC: 79 UG/DL (ref 61–157)
VIT D+METAB SERPL-MCNC: 35 NG/ML (ref 20–50)

## 2024-02-01 PROCEDURE — 83550 IRON BINDING TEST: CPT

## 2024-02-01 PROCEDURE — 36415 COLL VENOUS BLD VENIPUNCTURE: CPT

## 2024-02-01 PROCEDURE — 82306 VITAMIN D 25 HYDROXY: CPT

## 2024-02-01 PROCEDURE — 83540 ASSAY OF IRON: CPT

## 2024-02-21 ENCOUNTER — OFFICE VISIT (OUTPATIENT)
Dept: OPTOMETRY | Facility: CLINIC | Age: 54
End: 2024-02-21
Attending: FAMILY MEDICINE
Payer: COMMERCIAL

## 2024-02-21 DIAGNOSIS — H04.123 DRY EYES: ICD-10-CM

## 2024-02-21 DIAGNOSIS — H52.13 MYOPIA WITH PRESBYOPIA OF BOTH EYES: ICD-10-CM

## 2024-02-21 DIAGNOSIS — E11.9 DIABETES MELLITUS WITHOUT COMPLICATION (H): Primary | ICD-10-CM

## 2024-02-21 DIAGNOSIS — H52.4 MYOPIA WITH PRESBYOPIA OF BOTH EYES: ICD-10-CM

## 2024-02-21 PROCEDURE — 92014 COMPRE OPH EXAM EST PT 1/>: CPT

## 2024-02-21 PROCEDURE — 92015 DETERMINE REFRACTIVE STATE: CPT

## 2024-02-21 ASSESSMENT — REFRACTION_MANIFEST
OD_CYLINDER: +0.75
OS_AXIS: 153
OD_CYLINDER: +0.50
OD_ADD: +2.25
OD_SPHERE: -1.50
OD_AXIS: 003
OS_CYLINDER: +0.25
OD_SPHERE: -1.25
OS_SPHERE: -1.50
OS_ADD: +2.25
METHOD_AUTOREFRACTION: 1
OS_SPHERE: -1.50
OS_AXIS: 153
OS_CYLINDER: +0.25
OD_AXIS: 015

## 2024-02-21 ASSESSMENT — CONF VISUAL FIELD
OS_INFERIOR_TEMPORAL_RESTRICTION: 0
OS_NORMAL: 1
OS_INFERIOR_NASAL_RESTRICTION: 0
OD_NORMAL: 1
OD_INFERIOR_TEMPORAL_RESTRICTION: 0
OD_SUPERIOR_TEMPORAL_RESTRICTION: 0
OD_SUPERIOR_NASAL_RESTRICTION: 0
OD_INFERIOR_NASAL_RESTRICTION: 0
OS_SUPERIOR_TEMPORAL_RESTRICTION: 0
OS_SUPERIOR_NASAL_RESTRICTION: 0

## 2024-02-21 ASSESSMENT — VISUAL ACUITY
OD_PH_CC: 20/20
OS_CC: 20/20
OD_CC: 20/40
METHOD: SNELLEN - LINEAR
CORRECTION_TYPE: GLASSES
OS_CC: 20/20
OD_CC: 20/100

## 2024-02-21 ASSESSMENT — REFRACTION_WEARINGRX
OS_SPHERE: -2.00
OS_CYLINDER: +0.50
OD_ADD: +2.25
OD_CYLINDER: +1.00
SPECS_TYPE: PAL
OS_AXIS: 040
OD_SPHERE: -2.00
OS_ADD: +2.25
OD_AXIS: 035

## 2024-02-21 ASSESSMENT — TONOMETRY
OS_IOP_MMHG: 15
OD_IOP_MMHG: 15
IOP_METHOD: TONOPEN

## 2024-02-21 ASSESSMENT — EXTERNAL EXAM - RIGHT EYE: OD_EXAM: NORMAL

## 2024-02-21 ASSESSMENT — CUP TO DISC RATIO
OS_RATIO: 0.25
OD_RATIO: 0.25

## 2024-02-21 ASSESSMENT — EXTERNAL EXAM - LEFT EYE: OS_EXAM: NORMAL

## 2024-02-21 ASSESSMENT — SLIT LAMP EXAM - LIDS
COMMENTS: MEIBOMIAN GLAND DYSFUNCTION
COMMENTS: MEIBOMIAN GLAND DYSFUNCTION

## 2024-02-21 NOTE — LETTER
2/21/2024         RE: Tanner Olvera  [address]  [facility] [address]        Dear Colleague,    Thank you for referring your patient, Tanner Olvera, to the [facility]. Please see a copy of my visit note below.    Chief Complaint   Patient presents with     Diabetic Eye Exam        Chief Complaint(s) and History of Present Illness(es)       Diabetic Eye Exam                    Lab Results   Component Value Date    A1C 6.7 01/22/2024    A1C 6.3 06/13/2023    A1C 6.3 03/22/2023    A1C 6.4 12/09/2021    A1C 6.2 11/01/2021    A1C 6.3 12/23/2020    A1C 6.5 12/11/2019    A1C 6.2 11/26/2018    A1C 6.3 12/15/2017    A1C 6.1 12/23/2016            Last Eye Exam: 3/20/23  Dilated Previously: Yes    What are you currently using to see?  Glasses, 3 or 4 years old. Takes them off to work on the computer    Distance Vision Acuity: Satisfied with vision    Near Vision Acuity: Not satisfied     Eye Comfort: good most of the time but will occasionally wake up with matter in the outside corners of his eyes. He spends a lot of time on the computer and his eyes will feel dry and tired at the end of the day. He is also concerned about the blood thinners having an effect on his eyes  Do you use eye drops? : No  Occupation or Hobbies: computer work    Emeli Cooper  Optometric Assistant, [facility]       Medical, surgical and family histories reviewed and updated 2/21/2024.       OBJECTIVE: See Ophthalmology exam    ASSESSMENT:    ICD-10-CM    1. Diabetes mellitus without complication (H)  E11.9 Adult Eye  Referral      2. Dry eyes  H04.123       3. Myopia with presbyopia of both eyes  H52.13     H52.4           PLAN:    Tanner Chaveztitasharan aware  eye exam results will be sent to Vishal Samuels  Patient Instructions   DM without complication: No retinopathy on today's exam.  Patient educated on condition. Stressed importance of close BS/BP monitoring, diet/exercise, medication compliance,  and regular visits with PCP. Monitor annually.   Dry eyes: Patient educated on condition.  Initiate ATs 3-4x/daily (Systane, Refresh, Theratears, Blink) and daily warm compresses with Ynes mask for 10 minutes.  Monitor annually.  Myopia with presbyopia, both eyes: Updated glasses prescription for full-time wear.  Monitor annually.    Annmarie Mcwilliams, OD           Again, thank you for allowing me to participate in the care of your patient.        Sincerely,        Annmarie Mcwilliams, OD

## 2024-02-21 NOTE — PATIENT INSTRUCTIONS
DM without complication: No retinopathy on today's exam.  Patient educated on condition. Stressed importance of close BS/BP monitoring, diet/exercise, medication compliance, and regular visits with PCP. Monitor annually.   Dry eyes: Patient educated on condition.  Initiate ATs 3-4x/daily (Systane, Refresh, Theratears, Blink) and daily warm compresses with Ynes mask for 10 minutes.  Monitor annually.  Myopia with presbyopia, both eyes: Updated glasses prescription for full-time wear.  Monitor annually.

## 2024-02-21 NOTE — PROGRESS NOTES
Chief Complaint   Patient presents with    Diabetic Eye Exam        Chief Complaint(s) and History of Present Illness(es)       Diabetic Eye Exam                    Lab Results   Component Value Date    A1C 6.7 01/22/2024    A1C 6.3 06/13/2023    A1C 6.3 03/22/2023    A1C 6.4 12/09/2021    A1C 6.2 11/01/2021    A1C 6.3 12/23/2020    A1C 6.5 12/11/2019    A1C 6.2 11/26/2018    A1C 6.3 12/15/2017    A1C 6.1 12/23/2016            Last Eye Exam: 3/20/23  Dilated Previously: Yes    What are you currently using to see?  Glasses, 3 or 4 years old. Takes them off to work on the computer    Distance Vision Acuity: Satisfied with vision    Near Vision Acuity: Not satisfied     Eye Comfort: good most of the time but will occasionally wake up with matter in the outside corners of his eyes. He spends a lot of time on the computer and his eyes will feel dry and tired at the end of the day. He is also concerned about the blood thinners having an effect on his eyes  Do you use eye drops? : No  Occupation or Hobbies: computer work    Emeli Cooper  Optometric Assistant, [facility]       Medical, surgical and family histories reviewed and updated 2/21/2024.       OBJECTIVE: See Ophthalmology exam    ASSESSMENT:    ICD-10-CM    1. Diabetes mellitus without complication (H)  E11.9 Adult Eye  Referral      2. Dry eyes  H04.123       3. Myopia with presbyopia of both eyes  H52.13     H52.4           PLAN:    Tanner Olvera aware  eye exam results will be sent to Vishal Samuels  Patient Instructions   DM without complication: No retinopathy on today's exam.  Patient educated on condition. Stressed importance of close BS/BP monitoring, diet/exercise, medication compliance, and regular visits with PCP. Monitor annually.   Dry eyes: Patient educated on condition.  Initiate ATs 3-4x/daily (Systane, Refresh, Theratears, Blink) and daily warm compresses with Ynes mask for 10 minutes.  Monitor annually.  Myopia with presbyopia, both  eyes: Updated glasses prescription for full-time wear.  Monitor annually.    Annmarie Mcwilliams, OD

## 2024-03-20 ENCOUNTER — TELEPHONE (OUTPATIENT)
Dept: OPHTHALMOLOGY | Facility: CLINIC | Age: 54
End: 2024-03-20
Payer: COMMERCIAL

## 2024-03-20 NOTE — TELEPHONE ENCOUNTER
[facility] Call Center    Phone Message    May a detailed message be left on voicemail: yes     Reason for Call: Other: Patient is asking to speak with a nurse or member of Dr. Weinstein's care team to discuss questions about his glasses Rx.  Patient can be reached at [phone].  Thank you!      Action Taken: Message routed to:  Other: [facility]     Travel Screening: Not Applicable

## 2024-04-17 DIAGNOSIS — I82.412 ACUTE DEEP VEIN THROMBOSIS (DVT) OF FEMORAL VEIN OF LEFT LOWER EXTREMITY (H): ICD-10-CM

## 2024-04-17 RX ORDER — APIXABAN 5 MG/1
TABLET, FILM COATED ORAL
Qty: 60 TABLET | Refills: 3 | Status: SHIPPED | OUTPATIENT
Start: 2024-04-17 | End: 2024-05-16

## 2024-05-07 ENCOUNTER — DOCUMENTATION ONLY (OUTPATIENT)
Dept: LAB | Facility: CLINIC | Age: 54
End: 2024-05-07
Payer: COMMERCIAL

## 2024-05-07 ENCOUNTER — MYC MEDICAL ADVICE (OUTPATIENT)
Dept: FAMILY MEDICINE | Facility: CLINIC | Age: 54
End: 2024-05-07
Payer: COMMERCIAL

## 2024-05-07 DIAGNOSIS — R79.89 LOW VITAMIN D LEVEL: ICD-10-CM

## 2024-05-07 DIAGNOSIS — I82.412 ACUTE DEEP VEIN THROMBOSIS (DVT) OF FEMORAL VEIN OF LEFT LOWER EXTREMITY (H): Primary | ICD-10-CM

## 2024-05-07 DIAGNOSIS — E11.9 DIABETES MELLITUS WITHOUT COMPLICATION (H): ICD-10-CM

## 2024-05-07 DIAGNOSIS — E78.5 HYPERLIPIDEMIA LDL GOAL <100: ICD-10-CM

## 2024-05-07 DIAGNOSIS — Z12.5 SCREENING FOR PROSTATE CANCER: ICD-10-CM

## 2024-05-07 DIAGNOSIS — E61.1 IRON DEFICIENCY: ICD-10-CM

## 2024-05-07 DIAGNOSIS — E11.9 DIABETES MELLITUS WITHOUT COMPLICATION (H): Primary | ICD-10-CM

## 2024-05-07 DIAGNOSIS — E03.8 SUBCLINICAL HYPOTHYROIDISM: ICD-10-CM

## 2024-05-07 NOTE — PROGRESS NOTES
Tanner Olvera has an upcoming lab appointment:    Future Appointments   Date Time Provider Department Center   5/9/2024 10:30 AM BA LAB ONLY [facility]   5/10/2024  8:00 AM Vishal Samuels MD [facility]   8/26/2024  8:45 AM Eyad Charles MD [facility]     Patient is scheduled for the following lab(s):     There is no order available. Please review and place either future orders or HMPO (Review of Health Maintenance Protocol Orders), as appropriate.    Health Maintenance Due   Topic    ANNUAL REVIEW OF HM ORDERS      Lyric Son

## 2024-05-07 NOTE — TELEPHONE ENCOUNTER
FYI - Status Update    Who is Calling: Patient    Update: patient needs orders sent in for    Comprehensive Metabolic panel  Vitamin D deficiency       Does caller want a call/response back: Yes     Could we send this information to you in MEEP or would you prefer to receive a phone call?:   Patient would prefer a phone call   Okay to leave a detailed message?: Yes at Cell number on file:    Telephone Information:   Mobile [phone]

## 2024-05-08 ENCOUNTER — TELEPHONE (OUTPATIENT)
Dept: FAMILY MEDICINE | Facility: CLINIC | Age: 54
End: 2024-05-08
Payer: COMMERCIAL

## 2024-05-08 NOTE — TELEPHONE ENCOUNTER
"Spoke with patient-     He wanted me to give you the message that he seen Nixon Armstrong MD thrombosis provider.     Discontinue eliquis as you have now completed your course of treatment for your blood clots  -You could begin aspirin 81mg daily which may confer a modest decreased risk in future blood clots  -Our staff will try to find the referral for culturally aware nutrition referral  -Please have your blood work at your convenience. Our staff will give you the codes to verify insurance  -Have your d-dimer testing 1 month after you stop your eliquis  -If there are abnormal results we can have a follow up visit. If you see abnormal results please call to schedule a visit  -If you travel, ambulate every 1-2 hours, wear graduated compression stockings the days you travel, maintain good hydration  -If you have a surgery in the future have the surgeon contact the thrombosis clinic prior to evaluate if you require post operative low dose anticoagulation to prevent blood clots at that time  -Please read recurrent signs and symptoms and [email or web address]     He wants to make sure that Eliquis is off his medication list.     He also said that:   Specifically, I would like to get 's feedback on the \"Protein C Deficiency\" test where the number is below the normal.   "

## 2024-05-09 ENCOUNTER — LAB (OUTPATIENT)
Dept: LAB | Facility: CLINIC | Age: 54
End: 2024-05-09
Payer: COMMERCIAL

## 2024-05-09 DIAGNOSIS — E78.5 HYPERLIPIDEMIA LDL GOAL <100: ICD-10-CM

## 2024-05-09 DIAGNOSIS — E11.9 DIABETES MELLITUS WITHOUT COMPLICATION (H): ICD-10-CM

## 2024-05-09 DIAGNOSIS — E03.8 SUBCLINICAL HYPOTHYROIDISM: ICD-10-CM

## 2024-05-09 DIAGNOSIS — R79.89 LOW VITAMIN D LEVEL: ICD-10-CM

## 2024-05-09 DIAGNOSIS — E61.1 IRON DEFICIENCY: ICD-10-CM

## 2024-05-09 DIAGNOSIS — I82.412 ACUTE DEEP VEIN THROMBOSIS (DVT) OF FEMORAL VEIN OF LEFT LOWER EXTREMITY (H): ICD-10-CM

## 2024-05-09 DIAGNOSIS — Z12.5 SCREENING FOR PROSTATE CANCER: ICD-10-CM

## 2024-05-09 LAB
ALT SERPL W P-5'-P-CCNC: 37 U/L (ref 0–70)
ANION GAP SERPL CALCULATED.3IONS-SCNC: 10 MMOL/L (ref 7–15)
BUN SERPL-MCNC: 7.1 MG/DL (ref 6–20)
CALCIUM SERPL-MCNC: 9.3 MG/DL (ref 8.6–10)
CHLORIDE SERPL-SCNC: 100 MMOL/L (ref 98–107)
CHOLEST SERPL-MCNC: 169 MG/DL
CREAT SERPL-MCNC: 0.81 MG/DL (ref 0.67–1.17)
CREAT UR-MCNC: 61 MG/DL
DEPRECATED HCO3 PLAS-SCNC: 27 MMOL/L (ref 22–29)
EGFRCR SERPLBLD CKD-EPI 2021: >90 ML/MIN/1.73M2
FASTING STATUS PATIENT QL REPORTED: YES
FASTING STATUS PATIENT QL REPORTED: YES
GLUCOSE SERPL-MCNC: 113 MG/DL (ref 70–99)
HBA1C MFR BLD: 6.3 % (ref 0–5.6)
HDLC SERPL-MCNC: 37 MG/DL
LDLC SERPL CALC-MCNC: 106 MG/DL
MICROALBUMIN UR-MCNC: <12 MG/L
MICROALBUMIN/CREAT UR: NORMAL MG/G{CREAT}
NONHDLC SERPL-MCNC: 132 MG/DL
POTASSIUM SERPL-SCNC: 4.9 MMOL/L (ref 3.4–5.3)
PSA SERPL DL<=0.01 NG/ML-MCNC: 1.03 NG/ML (ref 0–3.5)
SODIUM SERPL-SCNC: 137 MMOL/L (ref 135–145)
T3FREE SERPL-MCNC: 3.3 PG/ML (ref 2–4.4)
T4 FREE SERPL-MCNC: 1.33 NG/DL (ref 0.9–1.7)
TRIGL SERPL-MCNC: 131 MG/DL
VIT B12 SERPL-MCNC: 743 PG/ML (ref 232–1245)
VIT D+METAB SERPL-MCNC: 40 NG/ML (ref 20–50)

## 2024-05-09 PROCEDURE — 99000 SPECIMEN HANDLING OFFICE-LAB: CPT

## 2024-05-09 PROCEDURE — 80061 LIPID PANEL: CPT

## 2024-05-09 PROCEDURE — 84439 ASSAY OF FREE THYROXINE: CPT

## 2024-05-09 PROCEDURE — 82306 VITAMIN D 25 HYDROXY: CPT

## 2024-05-09 PROCEDURE — 84597 ASSAY OF VITAMIN K: CPT | Mod: 90

## 2024-05-09 PROCEDURE — 84460 ALANINE AMINO (ALT) (SGPT): CPT

## 2024-05-09 PROCEDURE — 84481 FREE ASSAY (FT-3): CPT

## 2024-05-09 PROCEDURE — G0103 PSA SCREENING: HCPCS

## 2024-05-09 PROCEDURE — 82043 UR ALBUMIN QUANTITATIVE: CPT

## 2024-05-09 PROCEDURE — 83036 HEMOGLOBIN GLYCOSYLATED A1C: CPT

## 2024-05-09 PROCEDURE — 82570 ASSAY OF URINE CREATININE: CPT

## 2024-05-09 PROCEDURE — 80048 BASIC METABOLIC PNL TOTAL CA: CPT

## 2024-05-09 PROCEDURE — 36415 COLL VENOUS BLD VENIPUNCTURE: CPT

## 2024-05-09 PROCEDURE — 82607 VITAMIN B-12: CPT

## 2024-05-09 PROCEDURE — 84446 ASSAY OF VITAMIN E: CPT | Mod: 90

## 2024-05-12 LAB
A-TOCOPHEROL VIT E SERPL-MCNC: 11.9 MG/L
BETA+GAMMA TOCOPHEROL SERPL-MCNC: 0.5 MG/L

## 2024-05-13 LAB — PHYTONADIONE SERPL-MCNC: 0.8 NMOL/L

## 2024-05-16 ENCOUNTER — VIRTUAL VISIT (OUTPATIENT)
Dept: FAMILY MEDICINE | Facility: CLINIC | Age: 54
End: 2024-05-16
Payer: COMMERCIAL

## 2024-05-16 DIAGNOSIS — I82.412 ACUTE DEEP VEIN THROMBOSIS (DVT) OF FEMORAL VEIN OF LEFT LOWER EXTREMITY (H): ICD-10-CM

## 2024-05-16 DIAGNOSIS — E11.9 DIABETES MELLITUS WITHOUT COMPLICATION (H): ICD-10-CM

## 2024-05-16 DIAGNOSIS — Z12.11 SPECIAL SCREENING FOR MALIGNANT NEOPLASMS, COLON: ICD-10-CM

## 2024-05-16 DIAGNOSIS — I26.92 ACUTE SADDLE PULMONARY EMBOLISM WITHOUT ACUTE COR PULMONALE (H): Primary | ICD-10-CM

## 2024-05-16 PROCEDURE — 99214 OFFICE O/P EST MOD 30 MIN: CPT | Mod: 95 | Performed by: FAMILY MEDICINE

## 2024-05-16 NOTE — PROGRESS NOTES
Tanner is a 53 year old who is being evaluated via a billable video visit.    How would you like to obtain your AVS? MyChart  If the video visit is dropped, the invitation should be resent by: Text to cell phone: [phone]  Will anyone else be joining your video visit? No      Assessment & Plan     Acute saddle pulmonary embolism without acute cor pulmonale (H)  Improved, stopped Eliquis, has no recurring sx, he has slightly low protein C level  ,encouraged him to consider wearing compression stocking and asa 81mg     Diabetes mellitus without complication (H)  Stable     Acute deep vein thrombosis (DVT) of femoral vein of left lower extremity (H)  Mentioned above     Special screening for malignant neoplasms, colon    - Colonoscopy Screening  Referral; Future            FUTURE APPOINTMENTS:       - Follow-up visit in 6 months for routine check     Subjective   Tanner is a 53 year old, presenting for the following health issues:  Deep Vein Thrombosis (Follow up.)        5/16/2024     9:43 AM   Additional Questions   Roomed by Brenda- Unable to reach     History of Present Illness       Reason for visit:  Post DVT follow up    He eats 2-3 servings of fruits and vegetables daily.He consumes 0 sweetened beverage(s) daily.He exercises with enough effort to increase his heart rate 30 to 60 minutes per day.  He exercises with enough effort to increase his heart rate 5 days per week.   He is taking medications regularly.       Concern - DVT  Onset: for over a year   Description: improving   Intensity: mild  Progression of Symptoms:  improving  Accompanying Signs & Symptoms: none  Previous history of similar problem: none   Precipitating factors:        Worsened by: none   Alleviating factors:        Improved by: none   Therapies tried and outcome: None        Review of Systems  Constitutional, HEENT, cardiovascular, pulmonary, GI, , musculoskeletal, neuro, skin, endocrine and psych systems are negative, except  as otherwise noted.      Objective           Vitals:  No vitals were obtained today due to virtual visit.    Physical Exam   GENERAL: alert and no distress  EYES: Eyes grossly normal to inspection.  No discharge or erythema, or obvious scleral/conjunctival abnormalities.  RESP: No audible wheeze, cough, or visible cyanosis.    SKIN: Visible skin clear. No significant rash, abnormal pigmentation or lesions.  NEURO: Cranial nerves grossly intact.  Mentation and speech appropriate for age.  PSYCH: Appropriate affect, tone, and pace of words          Video-Visit Details    Type of service:  Video Visit   Originating Location (pt. Location): Home    Distant Location (provider location):  On-site  Platform used for Video Visit: Zack  Signed Electronically by: Vishal Samuels MD      Video start: 9:30  Video finish: 10:01

## 2024-07-12 NOTE — PROGRESS NOTES
[facility] Dermatology Note    Encounter Date: Jul 15, 2024    Dermatology Problem List:  1. Dermatitis, seborrheic  -current tx: ketoconazole 2 % external shampoo    ______________________________________    Impression/Plan:  1. Reassurance provided for benign lesions not treated today including epidermoid cyst on the chest, lipoma of right upper extremity, dermatofibroma, and seborrheic keratoses.  -Discussed removal and/or treatment of lipomas, seborrheic keratoses, and epidermoid cyst as needed.    2, Dermatitis, seborrheic, mild on the scalp  -Start ketoconazole 2 % external shampoo, apply topically three times a week    Follow-up prn.    Staff Involved:  Staff/Scribe    Scribe Disclosure:   I, Stephanie Del Angel, am serving as a scribe to document services personally performed by Eyad Charles MD based on data collection and the provider's statements to me.     Provider Disclosure:   The documentation recorded by the scribe accurately reflects the services I personally performed and the decisions made by me.    Eyad Charles MD   of Dermatology  Department of Dermatology  [facility]      CC:   Chief Complaint   Patient presents with    Skin Check     Patient had a CT scan and then developed lesions on his right arm and chest. Patient would also like scalp issues addressed.         History of Present Illness:  Mr. Tanner Olvera is a 53 year old male who presents as a new patient for lesions on his right arm and chest that he noticed after a CT scan and scalp issues. He reports black spots on his scalp that have been there for years.    Labs:  N/A    Physical exam:  Vitals: There were no vitals taken for this visit.  GEN: This is a well developed, well-nourished male in no acute distress, in a pleasant mood.    SKIN: Hannon phototype 5  - Waist-up skin, which includes the head/face, neck, both arms, chest, back, abdomen, digits  and/or nails was examined.  - Hyperpigmented stuck-on papule on the occipital scalp  - Soft subcutaneous papule on the right forearm  - Subcutaneous papule with central punctum on the mid chest  - Fine scaling on the occipital scalp  - Hyperpigmented papule with positive dimple sign on the right upper arm  - No other lesions of concern on areas examined.     Past Medical History:   Past Medical History:   Diagnosis Date    Chest pain 12/7/2010    Dandruff 12/7/2010    Degenerative disc disease 1/1/2003    Hyperlipidemia LDL goal <100 9/2/2011    Left hemiparesis (H) 12/22/2016    LFTs abnormal 6/3/2011     (Problem list name updated by automated process. Provider to review and confirm.)    Sensory hearing loss, bilateral 1/11    Skin tags 12/7/2010    Type 2 diabetes, HbA1c goal < 7% (H) 9/2/2011    Vertigo 12/7/2010     Past Surgical History:   Procedure Laterality Date    NO HISTORY OF SURGERY         Social History:   reports that he has never smoked. He has never used smokeless tobacco. He reports that he does not drink alcohol and does not use drugs.    Family History:  Family History   Problem Relation Age of Onset    C.A.D. Father     Asthma No family hx of     Diabetes No family hx of     Hypertension No family hx of     Cancer - colorectal No family hx of     Prostate Cancer No family hx of     Anesthesia Reaction No family hx of     Blood Disease No family hx of     Eye Disorder No family hx of     Osteoporosis No family hx of     Thyroid Disease No family hx of     Glaucoma No family hx of     Macular Degeneration No family hx of        Medications:  Current Outpatient Medications   Medication Sig Dispense Refill    ketoconazole (NIZORAL) 2 % external shampoo Apply topically three times a week 120 mL 11    Multiple Vitamin (MULTI-VITAMINS) TABS Take 1 tablet by mouth daily      Vitamin D, Cholecalciferol, 25 MCG (1000 UT) TABS Take 2,000 Units by mouth       Allergies   Allergen Reactions    Penicillins  Rash

## 2024-07-15 ENCOUNTER — OFFICE VISIT (OUTPATIENT)
Dept: DERMATOLOGY | Facility: CLINIC | Age: 54
End: 2024-07-15
Payer: COMMERCIAL

## 2024-07-15 DIAGNOSIS — L72.0 EPIDERMOID CYST: ICD-10-CM

## 2024-07-15 DIAGNOSIS — L82.1 SEBORRHEIC KERATOSIS: ICD-10-CM

## 2024-07-15 DIAGNOSIS — D17.21 LIPOMA OF RIGHT UPPER EXTREMITY: ICD-10-CM

## 2024-07-15 DIAGNOSIS — D23.9 DERMATOFIBROMA: Primary | ICD-10-CM

## 2024-07-15 DIAGNOSIS — L21.9 DERMATITIS, SEBORRHEIC: ICD-10-CM

## 2024-07-15 PROCEDURE — 99203 OFFICE O/P NEW LOW 30 MIN: CPT | Performed by: DERMATOLOGY

## 2024-07-15 RX ORDER — KETOCONAZOLE 20 MG/ML
SHAMPOO TOPICAL
Qty: 120 ML | Refills: 11 | Status: SHIPPED | OUTPATIENT
Start: 2024-07-15

## 2024-07-15 ASSESSMENT — PAIN SCALES - GENERAL: PAINLEVEL: NO PAIN (0)

## 2024-07-15 NOTE — NURSING NOTE
Tanner Olvera's goals for this visit include:   Chief Complaint   Patient presents with    Skin Check     Patient had a CT scan and then developed lesions on his right arm and chest. Patient would also like scalp issues addressed.         He requests these members of his care team be copied on today's visit information:     PCP: Vishal Samuels    Referring Provider:  Referred Self, MD  No address on file    There were no vitals taken for this visit.    Do you need any medication refills at today's visit?     Jayashree Murillo on 7/15/2024 at 7:17 AM

## 2024-07-15 NOTE — PATIENT INSTRUCTIONS
Seborrheic keratosis: potential treatment is cryotherapy with liquid nitrogen (spray with liquid nitrogen)    Lipoma, epidermoid cyst: potential treatment is excision - outpatient procedure with stitches and will leave a scar    Dermatofibroma - generally do not treat these scars. Removal is likely to result in a slightly larger scar.

## 2024-07-15 NOTE — LETTER
7/15/2024      Tanner Olvera  [address]  [place] [address]      Dear Colleague,    Thank you for referring your patient, Tanner Olvera, to the [facility]. Please see a copy of my visit note below.    [facility] Dermatology Note    Encounter Date: Jul 15, 2024    Dermatology Problem List:  1. Dermatitis, seborrheic  -current tx: ketoconazole 2 % external shampoo    ______________________________________    Impression/Plan:  1. Reassurance provided for benign lesions not treated today including epidermoid cyst on the chest, lipoma of right upper extremity, dermatofibroma, and seborrheic keratoses.  -Discussed removal and/or treatment of lipomas, seborrheic keratoses, and epidermoid cyst as needed.    2, Dermatitis, seborrheic, mild on the scalp  -Start ketoconazole 2 % external shampoo, apply topically three times a week    Follow-up prn.    Staff Involved:  Staff/Scribe    Scribe Disclosure:   I, Stephanie Bean, am serving as a scribe to document services personally performed by Eyad Charles MD based on data collection and the provider's statements to me.     Provider Disclosure:   The documentation recorded by the scribe accurately reflects the services I personally performed and the decisions made by me.    Eyad Charles MD   of Dermatology  Department of Dermatology  [facility]      CC:   Chief Complaint   Patient presents with     Skin Check     Patient had a CT scan and then developed lesions on his right arm and chest. Patient would also like scalp issues addressed.         History of Present Illness:  Mr. Tanner Olvera is a 53 year old male who presents as a new patient for lesions on his right arm and chest that he noticed after a CT scan and scalp issues. He reports black spots on his scalp that have been there for years.    Labs:  N/A    Physical exam:  Vitals: There were  no vitals taken for this visit.  GEN: This is a well developed, well-nourished male in no acute distress, in a pleasant mood.    SKIN: Hannon phototype 5  - Waist-up skin, which includes the head/face, neck, both arms, chest, back, abdomen, digits and/or nails was examined.  - Hyperpigmented stuck-on papule on the occipital scalp  - Soft subcutaneous papule on the right forearm  - Subcutaneous papule with central punctum on the mid chest  - Fine scaling on the occipital scalp  - Hyperpigmented papule with positive dimple sign on the right upper arm  - No other lesions of concern on areas examined.     Past Medical History:   Past Medical History:   Diagnosis Date     Chest pain 12/7/2010     Dandruff 12/7/2010     Degenerative disc disease 1/1/2003     Hyperlipidemia LDL goal <100 9/2/2011     Left hemiparesis (H) 12/22/2016     LFTs abnormal 6/3/2011     (Problem list name updated by automated process. Provider to review and confirm.)     Sensory hearing loss, bilateral 1/11     Skin tags 12/7/2010     Type 2 diabetes, HbA1c goal < 7% (H) 9/2/2011     Vertigo 12/7/2010     Past Surgical History:   Procedure Laterality Date     NO HISTORY OF SURGERY         Social History:   reports that he has never smoked. He has never used smokeless tobacco. He reports that he does not drink alcohol and does not use drugs.    Family History:  Family History   Problem Relation Age of Onset     C.A.D. Father      Asthma No family hx of      Diabetes No family hx of      Hypertension No family hx of      Cancer - colorectal No family hx of      Prostate Cancer No family hx of      Anesthesia Reaction No family hx of      Blood Disease No family hx of      Eye Disorder No family hx of      Osteoporosis No family hx of      Thyroid Disease No family hx of      Glaucoma No family hx of      Macular Degeneration No family hx of        Medications:  Current Outpatient Medications   Medication Sig Dispense Refill     ketoconazole  (NIZORAL) 2 % external shampoo Apply topically three times a week 120 mL 11     Multiple Vitamin (MULTI-VITAMINS) TABS Take 1 tablet by mouth daily       Vitamin D, Cholecalciferol, 25 MCG (1000 UT) TABS Take 2,000 Units by mouth       Allergies   Allergen Reactions     Penicillins Rash         Again, thank you for allowing me to participate in the care of your patient.        Sincerely,        Eyad Charles MD

## 2024-08-29 ENCOUNTER — TELEPHONE (OUTPATIENT)
Dept: FAMILY MEDICINE | Facility: CLINIC | Age: 54
End: 2024-08-29
Payer: COMMERCIAL

## 2024-08-29 NOTE — LETTER
August 29, 2024      Tanner Olvera  [address]  [place] [facility] [place] [address]        Dear Tanner,    I care about your health and have reviewed your health plan. I have reviewed your medical conditions, medication list, and lab results and am making recommendations based on this review, to better manage your health.    You are in particular need of attention regarding:  -Colon Cancer Screening    I am recommending that you:  -schedule a COLONOSCOPY to look for colon cancer (due every 10 years or 5 years in higher risk situations.)   Colon cancer is now the second leading cause of death in the [place] for both men and women and there are over 130,000 new cases and 50,000 deaths per year from colon cancer.  Colonoscopies can prevent 90-95% of these deaths.  Problem lesions can be removed before they ever become cancer.  This test is not only looking for cancer, but also getting rid of precancerious lesions.  If you do not wish to do a colonoscopy or cannot afford to do one, at this time, there is another option. It is called a FIT test or Fecal Immunochemical Occult Blood Test (take home stool sample kit).  It does not replace the colonoscopy for colorectal cancer screening, but it can detect hidden bleeding in the lower colon.  It does need to be repeated every year and if a positive result is obtained, you would be referred for a colonoscopy.  If you have completed either one of these tests at another facility, please have the records sent to our clinic so that we can best coordinate your care.    Here is a list of Health Maintenance topics that are due now or due soon:  Health Maintenance Due   Topic Date Due    Colorectal Cancer Screening  Never done    Zoster (Shingles) Vaccine (1 of 2) Never done    ANNUAL REVIEW OF HM ORDERS  05/12/2024       Please call us at [phone] (or use scrible) to address the above recommendations.     Thank you for trusting [facility] and we appreciate the  opportunity to serve you.  We look forward to supporting your healthcare needs in the future.    Healthy Regards,    Vishal Samuels MD

## 2024-08-29 NOTE — TELEPHONE ENCOUNTER
Patient Quality Outreach    Patient is due for the following:   Colon Cancer Screening    Next Steps:   Schedule a office visit for colonscopy    Type of outreach:    Sent letter.      Questions for provider review:    None           Fabricio Valentine MA    8/29/2024  9:31 AM

## 2024-09-06 ENCOUNTER — MYC MEDICAL ADVICE (OUTPATIENT)
Dept: FAMILY MEDICINE | Facility: CLINIC | Age: 54
End: 2024-09-06

## 2024-09-06 ENCOUNTER — OFFICE VISIT (OUTPATIENT)
Dept: FAMILY MEDICINE | Facility: CLINIC | Age: 54
End: 2024-09-06
Payer: COMMERCIAL

## 2024-09-06 VITALS
TEMPERATURE: 97.9 F | DIASTOLIC BLOOD PRESSURE: 78 MMHG | OXYGEN SATURATION: 97 % | SYSTOLIC BLOOD PRESSURE: 122 MMHG | RESPIRATION RATE: 18 BRPM | HEIGHT: 61 IN | WEIGHT: 124 LBS | HEART RATE: 131 BPM | BODY MASS INDEX: 23.41 KG/M2

## 2024-09-06 DIAGNOSIS — E11.9 DIABETES MELLITUS WITHOUT COMPLICATION (H): ICD-10-CM

## 2024-09-06 DIAGNOSIS — G57.93 NEUROPATHY INVOLVING BOTH LOWER EXTREMITIES: Primary | ICD-10-CM

## 2024-09-06 DIAGNOSIS — H53.8 BLURRED VISION: ICD-10-CM

## 2024-09-06 DIAGNOSIS — E11.9 DIABETES MELLITUS WITHOUT COMPLICATION (H): Primary | ICD-10-CM

## 2024-09-06 LAB
ALT SERPL W P-5'-P-CCNC: 37 U/L (ref 0–70)
ANION GAP SERPL CALCULATED.3IONS-SCNC: 9 MMOL/L (ref 7–15)
BUN SERPL-MCNC: 6.9 MG/DL (ref 6–20)
CALCIUM SERPL-MCNC: 9.1 MG/DL (ref 8.8–10.4)
CHLORIDE SERPL-SCNC: 100 MMOL/L (ref 98–107)
CREAT SERPL-MCNC: 0.83 MG/DL (ref 0.67–1.17)
EGFRCR SERPLBLD CKD-EPI 2021: >90 ML/MIN/1.73M2
GLUCOSE SERPL-MCNC: 122 MG/DL (ref 70–99)
HBA1C MFR BLD: 6.5 % (ref 0–5.6)
HCO3 SERPL-SCNC: 27 MMOL/L (ref 22–29)
POTASSIUM SERPL-SCNC: 4.5 MMOL/L (ref 3.4–5.3)
SODIUM SERPL-SCNC: 136 MMOL/L (ref 135–145)

## 2024-09-06 PROCEDURE — 84460 ALANINE AMINO (ALT) (SGPT): CPT | Performed by: FAMILY MEDICINE

## 2024-09-06 PROCEDURE — 83036 HEMOGLOBIN GLYCOSYLATED A1C: CPT | Performed by: FAMILY MEDICINE

## 2024-09-06 PROCEDURE — 36415 COLL VENOUS BLD VENIPUNCTURE: CPT | Performed by: FAMILY MEDICINE

## 2024-09-06 PROCEDURE — 80048 BASIC METABOLIC PNL TOTAL CA: CPT | Performed by: FAMILY MEDICINE

## 2024-09-06 PROCEDURE — 99214 OFFICE O/P EST MOD 30 MIN: CPT | Performed by: FAMILY MEDICINE

## 2024-09-06 RX ORDER — KETOROLAC TROMETHAMINE 30 MG/ML
1 INJECTION, SOLUTION INTRAMUSCULAR; INTRAVENOUS CONTINUOUS
Qty: 1 EACH | Refills: 0 | Status: SHIPPED | OUTPATIENT
Start: 2024-09-06

## 2024-09-06 RX ORDER — FLASH GLUCOSE SCANNING READER
EACH MISCELLANEOUS
Qty: 1 EACH | Refills: 0 | Status: SHIPPED | OUTPATIENT
Start: 2024-09-06 | End: 2024-09-06

## 2024-09-06 RX ORDER — GABAPENTIN 100 MG/1
100 CAPSULE ORAL AT BEDTIME
Qty: 30 CAPSULE | Refills: 1 | Status: SHIPPED | OUTPATIENT
Start: 2024-09-06

## 2024-09-06 RX ORDER — BLOOD-GLUCOSE SENSOR
EACH MISCELLANEOUS
Qty: 6 EACH | Refills: 1 | Status: SHIPPED | OUTPATIENT
Start: 2024-09-06

## 2024-09-06 RX ORDER — FLASH GLUCOSE SENSOR
KIT MISCELLANEOUS
Qty: 2 EACH | Refills: 5 | Status: SHIPPED | OUTPATIENT
Start: 2024-09-06 | End: 2024-09-06

## 2024-09-06 ASSESSMENT — PAIN SCALES - GENERAL: PAINLEVEL: NO PAIN (0)

## 2024-09-06 NOTE — TELEPHONE ENCOUNTER
I will order, but there might  be a good change not covered by insurance since he doesn't use insulin.   He can fill out of his own expense if he wants in the case  Please let him know    maximilian

## 2024-09-06 NOTE — PROGRESS NOTES
Assessment & Plan     Neuropathy involving both lower extremities  Has been worsening, has long term controlled DM, will have him to check on EMG for further evaluation  Will have him to try gabapentin for a month for pain control    - EMG; Future  - Adult Neurology  Referral; Future  - gabapentin (NEURONTIN) 100 MG capsule; Take 1 capsule (100 mg) by mouth at bedtime.  - Basic metabolic panel  (Ca, Cl, CO2, Creat, Gluc, K, Na, BUN); Future  - ALT; Future    Blurred vision  Has been worsening, will have him to see eye clinic for further evaluation   - Adult Eye  Referral; Future  - Basic metabolic panel  (Ca, Cl, CO2, Creat, Gluc, K, Na, BUN); Future  - ALT; Future    Diabetes mellitus without complication (H)  Mentioned above   - EMG; Future  - Adult Neurology  Referral; Future  - Adult Eye  Referral; Future  - Hemoglobin A1c; Future  - Basic metabolic panel  (Ca, Cl, CO2, Creat, Gluc, K, Na, BUN); Future  - ALT; Future            FUTURE APPOINTMENTS:       - Follow-up visit in 1 month     Vaughn Hart is a 54 year old, presenting for the following health issues:  Foot Burn (Concerns regarding burning sensation in both feet. Worse at night time. Now experiencing eye sight blurriness. )        9/6/2024    10:47 AM   Additional Questions   Roomed by Brenda DUBOSE     History of Present Illness       Reason for visit:  Foot pain burning sensation  Symptom onset:  More than a month  Symptoms include:  Burning sensation  Symptom intensity:  Moderate  Symptom progression:  Staying the same  Had these symptoms before:  No   He is taking medications regularly.       Patient is coming in today for burning sensation in both feet. States it is worse at night, with blurry vision. Wondering if it correlates with working more, stress, or shoe structure related. Also is have Tachycardia.             Review of Systems  Constitutional, HEENT, cardiovascular, pulmonary, GI, ,  "musculoskeletal, neuro, skin, endocrine and psych systems are negative, except as otherwise noted.      Objective    /78   Pulse (!) 131   Temp 97.9  F (36.6  C) (Temporal)   Resp 18   Ht 1.555 m (5' 1.22\")   Wt 56.2 kg (124 lb)   SpO2 97%   BMI 23.26 kg/m    Body mass index is 23.26 kg/m .  Physical Exam   GENERAL: alert and no distress  EYES: Eyes grossly normal to inspection, PERRL and conjunctivae and sclerae normal  HENT: ear canals and TM's normal, nose and mouth without ulcers or lesions  NECK: no adenopathy, no asymmetry, masses, or scars  RESP: lungs clear to auscultation - no rales, rhonchi or wheezes  CV: regular rate and rhythm, normal S1 S2, no S3 or S4, no murmur, click or rub, no peripheral edema  ABDOMEN: soft, nontender, no hepatosplenomegaly, no masses and bowel sounds normal  MS: no gross musculoskeletal defects noted, no edema  NEURO: Normal strength and tone, mentation intact and speech normal  BACK: no CVA tenderness, no paralumbar tenderness  PSYCH: mentation appears normal, affect normal/bright            Signed Electronically by: Vishal Samuels MD    "

## 2024-09-06 NOTE — TELEPHONE ENCOUNTER
Pharmacy called and they are not able to fill as the kvng 1 had been discontinued they are needing the kvng 3 reader and kvng 3 plus sensors    Pended for review

## 2024-09-27 ENCOUNTER — LAB (OUTPATIENT)
Dept: LAB | Facility: CLINIC | Age: 54
End: 2024-09-27
Payer: COMMERCIAL

## 2024-09-27 DIAGNOSIS — E11.9 DIABETES MELLITUS WITHOUT COMPLICATION (H): ICD-10-CM

## 2024-09-27 DIAGNOSIS — E78.5 HYPERLIPIDEMIA LDL GOAL <100: ICD-10-CM

## 2024-09-27 DIAGNOSIS — G57.93 NEUROPATHY INVOLVING BOTH LOWER EXTREMITIES: ICD-10-CM

## 2024-09-27 LAB
CHOLEST SERPL-MCNC: 203 MG/DL
FASTING STATUS PATIENT QL REPORTED: YES
HDLC SERPL-MCNC: 42 MG/DL
INSULIN SERPL-ACNC: 12.2 UU/ML (ref 2.6–24.9)
LDLC SERPL CALC-MCNC: 144 MG/DL
NONHDLC SERPL-MCNC: 161 MG/DL
TRIGL SERPL-MCNC: 83 MG/DL
VIT B12 SERPL-MCNC: 694 PG/ML (ref 232–1245)
VIT D+METAB SERPL-MCNC: 36 NG/ML (ref 20–50)

## 2024-09-27 PROCEDURE — 82306 VITAMIN D 25 HYDROXY: CPT

## 2024-09-27 PROCEDURE — 82607 VITAMIN B-12: CPT

## 2024-09-27 PROCEDURE — 36415 COLL VENOUS BLD VENIPUNCTURE: CPT

## 2024-09-27 PROCEDURE — 83525 ASSAY OF INSULIN: CPT

## 2024-09-27 PROCEDURE — 80061 LIPID PANEL: CPT

## 2024-10-09 ENCOUNTER — TELEPHONE (OUTPATIENT)
Dept: GASTROENTEROLOGY | Facility: CLINIC | Age: 54
End: 2024-10-09
Payer: COMMERCIAL

## 2024-10-09 ENCOUNTER — HOSPITAL ENCOUNTER (OUTPATIENT)
Facility: CLINIC | Age: 54
End: 2024-10-09
Attending: INTERNAL MEDICINE | Admitting: INTERNAL MEDICINE
Payer: COMMERCIAL

## 2024-10-09 DIAGNOSIS — Z12.11 SPECIAL SCREENING FOR MALIGNANT NEOPLASMS, COLON: Primary | ICD-10-CM

## 2024-10-09 NOTE — TELEPHONE ENCOUNTER
"Pt would like it noted that they would like to have a prior auth performed.   Endoscopy Scheduling Screen    Have you had any respiratory illness or flu-like symptoms in the last 10 days?  No    What is your communication preference for Instructions and/or Bowel Prep?   MyChart    What insurance is in the chart?  Other:  BCBS    Ordering/Referring Provider: Vishal Samuels MD   (If ordering provider performs procedure, schedule with ordering provider unless otherwise instructed. )    BMI: Estimated body mass index is 23.26 kg/m  as calculated from the following:    Height as of 9/6/24: 1.555 m (5' 1.22\").    Weight as of 9/6/24: 56.2 kg (124 lb).     Sedation Ordered  moderate sedation.   If patient BMI > 50 do not schedule in [facility].    If patient BMI > 45 do not schedule at [facility].    Are you taking methadone or Suboxone?  NO, No RN review required.    Have you been diagnosed and are being treated for severe PTSD or severe anxiety?  NO, No RN review required.    Are you taking any prescription medications for pain 3 or more times per week?   NO, No RN review required.    Do you have a history of malignant hyperthermia?  No    (Females) Are you currently pregnant?   No     Have you been diagnosed or told you have pulmonary hypertension?   No    Do you have an LVAD?  No    Have you been told you have moderate to severe sleep apnea?  No.-pt stated mild    Have you been told you have COPD, asthma, or any other lung disease?  No    Do you have any heart conditions?  No     Have you ever had or are you waiting for an organ transplant?  No. Continue scheduling, no site restrictions.    Have you had a stroke or transient ischemic attack (TIA aka \"mini stroke\" in the last 6 months?   No    Have you been diagnosed with or been told you have cirrhosis of the liver?   No.    Are you currently on dialysis?   No    Do you need assistance transferring?   No    BMI: Estimated body mass index is 23.26 kg/m  as calculated from the " "following:    Height as of 9/6/24: 1.555 m (5' 1.22\").    Weight as of 9/6/24: 56.2 kg (124 lb).     Is patients BMI > 40 and scheduling location [facility]?  No    Do you take an injectable or oral medication for weight loss or diabetes (excluding insulin)?  No    Do you take the medication Naltrexone?  No    Do you take blood thinners?  No       Prep   Are you currently on dialysis or do you have chronic kidney disease?  No    Do you have a diagnosis of diabetes?  Yes (Golytely Prep)    Do you have a diagnosis of cystic fibrosis (CF)?  No    On a regular basis do you go 3 -5 days between bowel movements?  No    BMI > 40?  No    Preferred Pharmacy:    [id number] [address] [facility] [address]  [address]  [facility] [address]  Phone: [phone] Fax: [phone]      Final Scheduling Details     Procedure scheduled  Colonoscopy    Surgeon:  Vishal Samuels MD       Date of procedure:  11/19/2024     Pre-OP / PAC:   No - Not required for this site.    Location  [facility] - Patient preference.    Sedation   Moderate Sedation - Per order.      Patient Reminders:   You will receive a call from a Nurse to review instructions and health history.  This assessment must be completed prior to your procedure.  Failure to complete the Nurse assessment may result in the procedure being cancelled.      On the day of your procedure, please designate an adult(s) who can drive you home stay with you for the next 24 hours. The medicines used in the exam will make you sleepy. You will not be able to drive.      You cannot take public transportation, ride share services, or non-medical taxi service without a responsible caregiver.  Medical transport services are allowed with the requirement that a responsible caregiver will receive you at your destination.  We require that drivers and caregivers are confirmed prior to your procedure.  "

## 2024-10-16 ENCOUNTER — OFFICE VISIT (OUTPATIENT)
Dept: OPTOMETRY | Facility: CLINIC | Age: 54
End: 2024-10-16
Attending: FAMILY MEDICINE
Payer: COMMERCIAL

## 2024-10-16 DIAGNOSIS — H04.123 DRY EYES: Primary | ICD-10-CM

## 2024-10-16 PROCEDURE — 99212 OFFICE O/P EST SF 10 MIN: CPT

## 2024-10-16 RX ORDER — CARBOXYMETHYLCELLULOSE SODIUM 5 MG/ML
1 SOLUTION/ DROPS OPHTHALMIC 3 TIMES DAILY PRN
Qty: 30 EACH | Refills: 11 | Status: SHIPPED | OUTPATIENT
Start: 2024-10-16

## 2024-10-16 ASSESSMENT — EXTERNAL EXAM - RIGHT EYE: OD_EXAM: NORMAL

## 2024-10-16 ASSESSMENT — EXTERNAL EXAM - LEFT EYE: OS_EXAM: NORMAL

## 2024-10-16 ASSESSMENT — REFRACTION_WEARINGRX
SPECS_TYPE: PAL
OS_AXIS: 153
OD_AXIS: 015
OD_SPHERE: -1.50
OS_SPHERE: -1.50
OD_CYLINDER: +0.75
OS_CYLINDER: +0.25

## 2024-10-16 ASSESSMENT — VISUAL ACUITY
CORRECTION_TYPE: GLASSES
OD_CC: 20/20
METHOD: SNELLEN - LINEAR
OS_CC: 20/20

## 2024-10-16 NOTE — PROGRESS NOTES
"Chief Complaint   Patient presents with    Eye Problem Both Eyes     For the past 4-5 months- both eyes are dry, tired, burning occasionally, blurry vision, sticky thing in the morning in both eyes. On the right upper eye lid, there is a \"cyst\" on the left lid. Has tried warm compresses to help, and it hasn't. Has blurred vision but also only got single vision RX this time around as opposed to the progressive lens.              Ras Bravo - Optometric Assistant     See Review Of Systems       Medical, surgical and family histories reviewed and updated 10/16/2024.         OBJECTIVE: See Ophthalmology exam    ASSESSMENT:    ICD-10-CM    1. Dry eyes  H04.123          PLAN:    Patient Instructions   Dry eyes: 20/20 with habitual prescription.  Dry eye symptoms/vision clearing with blink.  Patient educated on condition.  Initiate preservative free ATs 3-4x/daily (Systane, Refresh, Theratears, Blink) and daily warm compresses with Ynes mask for 10 minutes.  Monitor annually.    Annmarie Mcwilliams, OD   "

## 2024-10-16 NOTE — LETTER
"10/16/2024      Tanner Olvera  [address]  [facility] [address]      Dear Colleague,    Thank you for referring your patient, Tanner Olvera, to the [facility]. Please see a copy of my visit note below.    Chief Complaint   Patient presents with     Eye Problem Both Eyes     For the past 4-5 months- both eyes are dry, tired, burning occasionally, blurry vision, sticky thing in the morning in both eyes. On the right upper eye lid, there is a \"cyst\" on the left lid. Has tried warm compresses to help, and it hasn't. Has blurred vision but also only got single vision RX this time around as opposed to the progressive lens.              Ras Bravo - Optometric Assistant     See Review Of Systems       Medical, surgical and family histories reviewed and updated 10/16/2024.         OBJECTIVE: See Ophthalmology exam    ASSESSMENT:    ICD-10-CM    1. Dry eyes  H04.123          PLAN:    Patient Instructions   Dry eyes: 20/20 with habitual prescription.  Dry eye symptoms/vision clearing with blink.  Patient educated on condition.  Initiate preservative free ATs 3-4x/daily (Systane, Refresh, Theratears, Blink) and daily warm compresses with Ynes mask for 10 minutes.  Monitor annually.    Annmarie Mcwilliams OD       Again, thank you for allowing me to participate in the care of your patient.        Sincerely,        Annmarie Mcwilliams, OD  "

## 2024-10-16 NOTE — PATIENT INSTRUCTIONS
Dry eyes: 20/20 with habitual prescription.  Dry eye symptoms/vision clearing with blink.  Patient educated on condition.  Initiate preservative free ATs 3-4x/daily (Systane, Refresh, Theratears, Blink) and daily warm compresses with Ynes mask for 10 minutes.  Monitor annually.

## 2024-10-28 RX ORDER — BISACODYL 5 MG/1
TABLET, DELAYED RELEASE ORAL
Qty: 4 TABLET | Refills: 0 | Status: SHIPPED | OUTPATIENT
Start: 2024-10-28

## 2024-10-28 NOTE — TELEPHONE ENCOUNTER
Standard Golytely Bowel Prep recommended due to diabetes.  Instructions were sent via [employer]. Bowel prep was sent 10/28/2024 to [id number] - [place], [place] - [address] AT [facility].

## 2024-11-14 NOTE — TELEPHONE ENCOUNTER
RECORDS RECEIVED FROM: internal   REASON FOR VISIT: Neuropathy involving both lower extremities    PROVIDER: Dr. Rangel   DATE OF APPT: 12/11/24   NOTES (FOR ALL VISITS) STATUS DETAILS   OFFICE NOTE from referring provider Internal Dr Vishal Samuels @ [facility] [place]:  9/6/24   EMG Internal [facility]:  11/25/24 *scheduled*   MEDICATION LIST Internal    IMAGING  (FOR ALL VISITS)     MRI (HEAD, NECK, SPINE) N/A    CT (HEAD, NECK, SPINE) N/A

## 2024-11-18 ENCOUNTER — TELEPHONE (OUTPATIENT)
Dept: GASTROENTEROLOGY | Facility: CLINIC | Age: 54
End: 2024-11-18
Payer: COMMERCIAL

## 2024-11-18 NOTE — TELEPHONE ENCOUNTER
Caller: Tanner    Reason for Reschedule/Cancellation   (please be detailed, any staff messages or encounters to note?): not NPO--per RN      Prior to reschedule please review:  Ordering Provider: Vishal Samuels   Sedation Determined: CS  Does patient have any ASC Exclusions, please identify?: no      Notes on Cancelled Procedure:  Procedure: Lower Endoscopy [Colonoscopy]   Date: 11/18  Location: [facility]; [address] [place] [address]   Surgeon: Kassandra      Rescheduled: Yes,   Procedure: Lower Endoscopy [Colonoscopy]    Date: 12/3   Location: [facility]; [address], [place] [address]    Surgeon: Dallas   Sedation Level Scheduled  CS ,  Reason for Sedation Level order   Instructions updated and sent: y     Does patient need PAC or Pre -Op Rescheduled? : n       Did you cancel or rescheduled an EUS procedure? No.

## 2024-11-20 ENCOUNTER — TELEPHONE (OUTPATIENT)
Dept: GASTROENTEROLOGY | Facility: CLINIC | Age: 54
End: 2024-11-20
Payer: COMMERCIAL

## 2024-11-20 NOTE — TELEPHONE ENCOUNTER
Pre visit planning completed.      Procedure details:    Patient scheduled for Colonoscopy on 12/3/24.     Arrival time: 1045. Procedure time 1130    Facility location: [facility] [place]; [address], [place] [address]. Check in location: 2nd Floor at Surgery desk.    Sedation type: Conscious sedation     Pre op exam needed? No.    Indication for procedure:   Special screening for malignant neoplasms, colon             Chart review:     Electronic implanted devices? No    Recent diagnosis of diverticulitis within the last 6 weeks? No      Medication review:    Diabetic? No    Anticoagulants? No    Weight loss medication/injectable? No GLP-1 medication per patient's medication list. Nursing to verify with pre-assessment call.    Other medication HOLDING recommendations:  N/A      Prep for procedure:     Bowel prep recommendation: Standard Golytely. Bowel prep prescription sent to      [id number] [place], [place] - [address] AT [facility]    Due to: diabetes.     Prep instructions sent via [employer] Website            Corinne Kliber, RN  Endoscopy Procedure Pre Assessment   [phone] option 2

## 2024-11-20 NOTE — TELEPHONE ENCOUNTER
Attempted to contact patient in order to complete pre assessment questions.     No answer. Left message to return call to [phone] option 2.    Callback communication sent via [employer].    Corinne Kliber, RN

## 2024-11-25 ENCOUNTER — OFFICE VISIT (OUTPATIENT)
Dept: NEUROLOGY | Facility: CLINIC | Age: 54
End: 2024-11-25
Attending: FAMILY MEDICINE
Payer: COMMERCIAL

## 2024-11-25 DIAGNOSIS — E11.9 DIABETES MELLITUS WITHOUT COMPLICATION (H): ICD-10-CM

## 2024-11-25 DIAGNOSIS — G57.93 NEUROPATHY INVOLVING BOTH LOWER EXTREMITIES: ICD-10-CM

## 2024-11-25 NOTE — LETTER
2024       RE: Tanner Olvera  [address]  [facility] [address]     Dear Colleague,    Thank you for referring your patient, Tanner Olvera, to the [facility] [place] at [facility]. Please see a copy of my visit note below.                        [facility]  Electrodiagnostic Laboratory                 Department of Neurology                                                                                                         Test Date:  2024    Patient: Tanner Olvera : 1970 Physician: Nixon Melgoza MD   Sex: --- AGE: 54 year Ref Phys: Dr. Vishal Samuels   ID#: [id number]   Technician: Kristy Behling     History and Examination:  54 year old with pre diabetes who reports an episode of sensory changes in his feet. This study is being performed to investigate for polyneuropathy.     Techniques:  Motor and sensory conduction studies were done with surface recording electrodes.   EMG was done with a concentric needle electrode.     Results:  Nerve conduction studies:   1. Bilateral sural and superficial peroneal sensory responses are normal.   2. Bilateral peroneal-EDB and tibial-AH motor responses are normal.     Needle EMG of selected right and left lower limb muscles was performed as tabulated below. No abnormal spontaneous activity was observed in the sampled muscles. Motor unit potential morphology and recruitment patterns were normal.     Interpretation:  This is a normal study. There is no electrophysiologic evidence of a large fiber polyneuropathy affecting the lower limbs on the basis of this study.     Nixon Melgoza MD  Department of Neurology        Nerve Conduction Studies  Motor Sites      Latency Neg. Amp Neg. Amp Diff Segment Distance Velocity Neg. Dur Neg Area Diff Temperature Comment   Site (ms) Norm (mV) Norm (%)  cm m/s Norm (ms) (%) ( C)    Left Fibular (EDB) Motor   Ankle 3.5  < 6.0  5.8 -  Ankle-EDB 8   4.5  32.6    Bel Fibular Head 10.3 - 4.7 - -19 Bel Fibular Head-Ankle 30 44  > 38 4.9 -9 32.5    Pop Fossa 11.7 - 4.3 - -9 Pop Fossa-Bel Fibular Head 9 64  > 38 5.3 2 32.5    Right Fibular (EDB) Motor   Ankle 4.2  < 6.0 6.3 -  Ankle-EDB 8   5.6  32.4    Left Tibial (AHB) Motor   Ankle 3.4  < 6.5 9.8  > 5.0  Ankle-AH 8   7.2  32.4    Knee 12.9 - 6.5 - -34 Knee-Ankle 38 40  > 38 6.1 -23 32.4    Right Tibial (AHB) Motor   Ankle 3.4  < 6.5 11.8  > 5.0  Ankle-AH 8   7.4  32.4      Sensory Sites      Onset Lat Peak Lat Amp (O-P) Amp (P-P) Segment Distance Velocity Temperature Comment   Site ms (ms)  V Norm ( V)  cm m/s Norm ( C)    Left Superficial Fibular Sensory   Lower Leg-Ankle 2.3 3.0 33  > 3 43 Lower Leg-Ankle 12.5 54 - 32    Right Superficial Fibular Sensory   Lower Leg-Ankle 2.9 3.6 21  > 3 23 Lower Leg-Ankle 12.5 43 - 32.4    Left Sural Sensory   Calf-Lat Malleolus 2.2 2.9 37  > 5 51 Calf-Lat Malleolus 14 64  > 38 32.8    Right Sural Sensory   Calf-Lat Malleolus 2.3 3.0 37  > 5 42 Calf-Lat Malleolus 14 61  > 38 32.3      F Wave Studies     Min-F Max-F Dispersion Persistence Mean-F F-Norm L-R Mean-F L-R Mean-F Norm F/M Ratio F-M Lat (ms)   Left Tibial (Abd Hallucis)  32.4  C   36.88 46.88 10.00 100.00 41.54 <61  <5.7 3.89 32.50       Electromyography     Side Muscle Ins Act Fibs/PSW Fasc HF Amp Dur Poly Recrt Int Pat   Left Tib ant Nml None Nml 0 Nml Nml 0 Nml Nml   Left Gastroc Nml None Nml 0 Nml Nml 0 Nml Nml   Right Tib ant Nml None Nml 0 Nml Nml 0 Nml Nml   Right Gastroc Nml None Nml 0 Nml Nml 0 Nml Nml         NCS Waveforms:    Motor                Sensory                   Ultrasound Images:                Again, thank you for allowing me to participate in the care of your patient.      Sincerely,    Nixon Melgoza MD

## 2024-11-25 NOTE — TELEPHONE ENCOUNTER
Pre assessment completed for upcoming procedure.   (Please see previous telephone encounter notes for complete details)    Patient  returned call.       Procedure details:    Arrival time and facility location reviewed.    Pre op exam needed? No.    Designated  policy reviewed. Instructed to have someone stay 6  hours post procedure.       Medication review:    Medications reviewed. Please see supporting documentation below. Holding recommendations discussed (if applicable).       Prep for procedure:     Procedure prep instructions reviewed.        Any additional information needed:  Discussed things patient could have on CLD for diabetes to help with BG.       Patient  verbalized understanding and had no questions or concerns at this time.      Katja Pino RN  Endoscopy Procedure Pre Assessment   [phone] option 2

## 2024-11-25 NOTE — PROGRESS NOTES
[facility]  Electrodiagnostic Laboratory                 Department of Neurology                                                                                                         Test Date:  2024    Patient: Tanner Olvera : 1970 Physician: Nixon Melgoza MD   Sex: --- AGE: 54 year Ref Phys: Dr. Vishal Samuels   ID#: [id number]   Technician: Kristy Behling     History and Examination:  54 year old with pre diabetes who reports an episode of sensory changes in his feet. This study is being performed to investigate for polyneuropathy.     Techniques:  Motor and sensory conduction studies were done with surface recording electrodes.   EMG was done with a concentric needle electrode.     Results:  Nerve conduction studies:   1. Bilateral sural and superficial peroneal sensory responses are normal.   2. Bilateral peroneal-EDB and tibial-AH motor responses are normal.     Needle EMG of selected right and left lower limb muscles was performed as tabulated below. No abnormal spontaneous activity was observed in the sampled muscles. Motor unit potential morphology and recruitment patterns were normal.     Interpretation:  This is a normal study. There is no electrophysiologic evidence of a large fiber polyneuropathy affecting the lower limbs on the basis of this study.     Nixon Melgoza MD  Department of Neurology        Nerve Conduction Studies  Motor Sites      Latency Neg. Amp Neg. Amp Diff Segment Distance Velocity Neg. Dur Neg Area Diff Temperature Comment   Site (ms) Norm (mV) Norm (%)  cm m/s Norm (ms) (%) ( C)    Left Fibular (EDB) Motor   Ankle 3.5  < 6.0 5.8 -  Ankle-EDB 8   4.5  32.6    Bel Fibular Head 10.3 - 4.7 - -19 Bel Fibular Head-Ankle 30 44  > 38 4.9 -9 32.5    Pop Fossa 11.7 - 4.3 - -9 Pop Fossa-Bel Fibular Head 9 64  > 38 5.3 2 32.5    Right Fibular (EDB) Motor   Ankle 4.2  < 6.0 6.3 -  Ankle-EDB 8   5.6  32.4    Left Tibial (AHB) Motor   Ankle 3.4  < 6.5  9.8  > 5.0  Ankle-AH 8   7.2  32.4    Knee 12.9 - 6.5 - -34 Knee-Ankle 38 40  > 38 6.1 -23 32.4    Right Tibial (AHB) Motor   Ankle 3.4  < 6.5 11.8  > 5.0  Ankle-AH 8   7.4  32.4      Sensory Sites      Onset Lat Peak Lat Amp (O-P) Amp (P-P) Segment Distance Velocity Temperature Comment   Site ms (ms)  V Norm ( V)  cm m/s Norm ( C)    Left Superficial Fibular Sensory   Lower Leg-Ankle 2.3 3.0 33  > 3 43 Lower Leg-Ankle 12.5 54 - 32    Right Superficial Fibular Sensory   Lower Leg-Ankle 2.9 3.6 21  > 3 23 Lower Leg-Ankle 12.5 43 - 32.4    Left Sural Sensory   Calf-Lat Malleolus 2.2 2.9 37  > 5 51 Calf-Lat Malleolus 14 64  > 38 32.8    Right Sural Sensory   Calf-Lat Malleolus 2.3 3.0 37  > 5 42 Calf-Lat Malleolus 14 61  > 38 32.3      F Wave Studies     Min-F Max-F Dispersion Persistence Mean-F F-Norm L-R Mean-F L-R Mean-F Norm F/M Ratio F-M Lat (ms)   Left Tibial (Abd Hallucis)  32.4  C   36.88 46.88 10.00 100.00 41.54 <61  <5.7 3.89 32.50       Electromyography     Side Muscle Ins Act Fibs/PSW Fasc HF Amp Dur Poly Recrt Int Pat   Left Tib ant Nml None Nml 0 Nml Nml 0 Nml Nml   Left Gastroc Nml None Nml 0 Nml Nml 0 Nml Nml   Right Tib ant Nml None Nml 0 Nml Nml 0 Nml Nml   Right Gastroc Nml None Nml 0 Nml Nml 0 Nml Nml         NCS Waveforms:    Motor                Sensory                   Ultrasound Images:

## 2024-11-25 NOTE — TELEPHONE ENCOUNTER
Second call attempt to complete pre assessment.     No answer.  Left message to return call to [phone] #2 by next business day prior to 4PM or procedure will be sent to cancel.     Callback required communication sent via [employer].      Eula Trejo RN  Endoscopy Procedure Pre Assessment

## 2024-12-03 ENCOUNTER — HOSPITAL ENCOUNTER (OUTPATIENT)
Facility: AMBULATORY SURGERY CENTER | Age: 54
Discharge: HOME | End: 2024-12-03
Attending: SURGERY
Payer: COMMERCIAL

## 2024-12-03 VITALS
DIASTOLIC BLOOD PRESSURE: 84 MMHG | SYSTOLIC BLOOD PRESSURE: 120 MMHG | HEART RATE: 93 BPM | TEMPERATURE: 97.2 F | OXYGEN SATURATION: 99 % | RESPIRATION RATE: 16 BRPM

## 2024-12-03 LAB — COLONOSCOPY: NORMAL

## 2024-12-03 RX ORDER — FENTANYL CITRATE 50 UG/ML
INJECTION, SOLUTION INTRAMUSCULAR; INTRAVENOUS PRN
Status: DISCONTINUED | OUTPATIENT
Start: 2024-12-03 | End: 2024-12-03 | Stop reason: HOSPADM

## 2024-12-03 RX ORDER — ONDANSETRON 4 MG/1
4 TABLET, ORALLY DISINTEGRATING ORAL EVERY 6 HOURS PRN
Status: DISCONTINUED | OUTPATIENT
Start: 2024-12-03 | End: 2024-12-04 | Stop reason: HOSPADM

## 2024-12-03 RX ORDER — NALOXONE HYDROCHLORIDE 0.4 MG/ML
0.2 INJECTION, SOLUTION INTRAMUSCULAR; INTRAVENOUS; SUBCUTANEOUS
Status: DISCONTINUED | OUTPATIENT
Start: 2024-12-03 | End: 2024-12-04 | Stop reason: HOSPADM

## 2024-12-03 RX ORDER — ONDANSETRON 2 MG/ML
4 INJECTION INTRAMUSCULAR; INTRAVENOUS
Status: DISCONTINUED | OUTPATIENT
Start: 2024-12-03 | End: 2024-12-04 | Stop reason: HOSPADM

## 2024-12-03 RX ORDER — ONDANSETRON 2 MG/ML
4 INJECTION INTRAMUSCULAR; INTRAVENOUS EVERY 6 HOURS PRN
Status: DISCONTINUED | OUTPATIENT
Start: 2024-12-03 | End: 2024-12-04 | Stop reason: HOSPADM

## 2024-12-03 RX ORDER — NALOXONE HYDROCHLORIDE 0.4 MG/ML
0.4 INJECTION, SOLUTION INTRAMUSCULAR; INTRAVENOUS; SUBCUTANEOUS
Status: DISCONTINUED | OUTPATIENT
Start: 2024-12-03 | End: 2024-12-04 | Stop reason: HOSPADM

## 2024-12-03 RX ORDER — FLUMAZENIL 0.1 MG/ML
0.2 INJECTION, SOLUTION INTRAVENOUS
Status: DISCONTINUED | OUTPATIENT
Start: 2024-12-03 | End: 2024-12-04 | Stop reason: HOSPADM

## 2024-12-03 RX ORDER — PROCHLORPERAZINE MALEATE 10 MG
10 TABLET ORAL EVERY 6 HOURS PRN
Status: DISCONTINUED | OUTPATIENT
Start: 2024-12-03 | End: 2024-12-04 | Stop reason: HOSPADM

## 2024-12-03 RX ORDER — LIDOCAINE 40 MG/G
CREAM TOPICAL
Status: DISCONTINUED | OUTPATIENT
Start: 2024-12-03 | End: 2024-12-04 | Stop reason: HOSPADM

## 2024-12-03 NOTE — H&P
Patient seen for Endoscopy    HPI:  Patient is a 54 year old male here for endoscopy. Not currently taking blood thinning medications/held for procedure if they do. No recent MI or CVA history. No issues with previous sedation. No recent acute illness.    Review Of Systems    Skin: negative  Ears/Nose/Throat: negative  Respiratory: No shortness of breath, dyspnea on exertion, cough, or hemoptysis  Cardiovascular: negative  Gastrointestinal: negative  Genitourinary: negative  Musculoskeletal: negative  Neurologic: negative  Hematologic/Lymphatic/Immunologic: negative  Endocrine: negative      Past Medical History:   Diagnosis Date    Chest pain 12/7/2010    Dandruff 12/7/2010    Degenerative disc disease 1/1/2003    Hyperlipidemia LDL goal <100 9/2/2011    Left hemiparesis (H) 12/22/2016    LFTs abnormal 6/3/2011     (Problem list name updated by automated process. 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 Father     Asthma No family hx of     Diabetes No family hx of     Hypertension No family hx of     Cancer - colorectal No family hx of     Prostate Cancer No family hx of     Anesthesia Reaction No family hx of     Blood Disease No family hx of     Eye Disorder No family hx of     Osteoporosis No family hx of     Thyroid Disease No family hx of     Glaucoma No family hx of     Macular Degeneration No family hx of        Social History     Socioeconomic History    Marital status:      Spouse name: Not on file    Number of children: 1    Years of education: 18    Highest education level: Not on file   Occupational History    Occupation:      Employer: [employer]   Tobacco Use    Smoking status: Never    Smokeless tobacco:  Never    Tobacco comments:     Non Smoker   Vaping Use    Vaping status: Never Used   Substance and Sexual Activity    Alcohol use: No     Comment: nothing    Drug use: No    Sexual activity: Not Currently     Partners: Female     Birth control/protection: None   Other Topics Concern    Parent/sibling w/ CABG, MI or angioplasty before 65F 55M? No     Service No    Blood Transfusions No    Caffeine Concern Not Asked    Occupational Exposure Not Asked    Hobby Hazards Not Asked    Sleep Concern Not Asked    Stress Concern Not Asked    Weight Concern Not Asked    Special Diet Not Asked    Back Care Not Asked    Exercise No    Bike Helmet Not Asked    Seat Belt Yes    Self-Exams Not Asked   Social History Narrative    Vegetarian. Calcium, vitamin D and vitamin B12 supplements recommended.      Social Drivers of Health     Financial Resource Strain: Low Risk  (1/22/2024)    Financial Resource Strain     Within the past 12 months, have you or your family members you live with been unable to get utilities (heat, electricity) when it was really needed?: No   Food Insecurity: Low Risk  (1/22/2024)    Food Insecurity     Within the past 12 months, did you worry that your food would run out before you got money to buy more?: No     Within the past 12 months, did the food you bought just not last and you didn t have money to get more?: No   Transportation Needs: Low Risk  (1/22/2024)    Transportation Needs     Within the past 12 months, has lack of transportation kept you from medical appointments, getting your medicines, non-medical meetings or appointments, work, or from getting things that you need?: No   Physical Activity: Not on file   Stress: Not on file   Social Connections: Not on file   Interpersonal Safety: Low Risk  (1/22/2024)    Interpersonal Safety     Do you feel physically and emotionally safe where you currently live?: Yes     Within the past 12 months, have you been hit, slapped, kicked or otherwise  physically hurt by someone?: No     Within the past 12 months, have you been humiliated or emotionally abused in other ways by your partner or ex-partner?: No   Housing Stability: Low Risk  (1/22/2024)    Housing Stability     Do you have housing? : Yes     Are you worried about losing your housing?: No       Current Outpatient Medications   Medication Sig Dispense Refill    Multiple Vitamin (MULTI-VITAMINS) TABS Take 1 tablet by mouth daily      Vitamin D, Cholecalciferol, 25 MCG (1000 UT) TABS Take 2,000 Units by mouth      bisacodyl (DULCOLAX) 5 MG EC tablet Take 2 tablets at 3 pm the day before your procedure. If your procedure is before 11 am, take 2 additional tablets at 11 pm. If your procedure is after 11 am, take 2 additional tablets at 6 am. For additional instructions refer to your colonoscopy prep instructions. 4 tablet 0    carboxymethylcellulose PF (REFRESH PLUS) 0.5 % ophthalmic solution Place 1 drop into both eyes 3 times daily as needed for dry eyes. 30 each 11    Continuous Glucose  (FREESTYLE ABEBE 3 READER) JOSE 1 Device continuously. 1 each 0    Continuous Glucose Sensor (FREESTYLE ABEBE 3 PLUS SENSOR) MISC Change every 15 days. 6 each 1    gabapentin (NEURONTIN) 100 MG capsule Take 1 capsule (100 mg) by mouth at bedtime. 30 capsule 1    ketoconazole (NIZORAL) 2 % external shampoo Apply topically three times a week (Patient not taking: Reported on 9/6/2024) 120 mL 11    polyethylene glycol (GOLYTELY) 236 g suspension The night before the exam at 6 pm drink an 8-ounce glass every 15 minutes until the jug is half empty. If you arrive before 11 AM: Drink the other half of the Golytely jug at 11 PM night before procedure. If you arrive after 11 AM: Drink the other half of the Golytely jug at 6 AM day of procedure. For additional instructions refer to your colonoscopy prep instructions. 4000 mL 0       Medications and history reviewed    Physical exam:  Vitals: BP (!) 140/88   Temp 97.2  F  (36.2  C) (Temporal)   Resp 16   SpO2 100%   BMI= There is no height or weight on file to calculate BMI.    Constitutional: Healthy, alert, non-distressed   Head: Normo-cephalic, atraumatic, no lesions, masses or tenderness   Cardiovascular: RRR, no new murmurs, +S1, +S2 heart sounds, no clicks, rubs or gallops   Respiratory: CTAB, no rales, rhonchi or wheezing, equal chest rise, good respiratory effort   Gastrointestinal: Soft, non-tender, non distended, no rebound rigidity or guarding, no masses or hernias palpated   : Deferred  Musculoskeletal: Moves all extremities, normal  strength, no deformities noted   Skin: No suspicious lesions or rashes   Psychiatric: Mentation appears normal, affect appropriate   Hematologic/Lymphatic/Immunologic: Normal cervical and supraclavicular lymph nodes   Patient able to get up on table without difficulty.    Labs show:  No results found for this or any previous visit (from the past 24 hours).    Assessment: Endoscopy  Plan: Pt cleared for anesthesia for proposed procedure.    Thiago Haynes DO

## 2024-12-05 LAB
PATH REPORT.COMMENTS IMP SPEC: NORMAL
PATH REPORT.COMMENTS IMP SPEC: NORMAL
PATH REPORT.FINAL DX SPEC: NORMAL
PATH REPORT.GROSS SPEC: NORMAL
PATH REPORT.MICROSCOPIC SPEC OTHER STN: NORMAL
PATH REPORT.RELEVANT HX SPEC: NORMAL
PHOTO IMAGE: NORMAL

## 2024-12-11 ENCOUNTER — PRE VISIT (OUTPATIENT)
Dept: NEUROLOGY | Facility: CLINIC | Age: 54
End: 2024-12-11

## 2024-12-11 ENCOUNTER — OFFICE VISIT (OUTPATIENT)
Dept: NEUROLOGY | Facility: CLINIC | Age: 54
End: 2024-12-11
Attending: FAMILY MEDICINE
Payer: COMMERCIAL

## 2024-12-11 ENCOUNTER — LAB (OUTPATIENT)
Dept: LAB | Facility: CLINIC | Age: 54
End: 2024-12-11
Payer: COMMERCIAL

## 2024-12-11 VITALS
OXYGEN SATURATION: 98 % | RESPIRATION RATE: 16 BRPM | DIASTOLIC BLOOD PRESSURE: 92 MMHG | SYSTOLIC BLOOD PRESSURE: 137 MMHG | HEART RATE: 88 BPM

## 2024-12-11 DIAGNOSIS — R73.03 PREDIABETES: ICD-10-CM

## 2024-12-11 DIAGNOSIS — R20.2 PARESTHESIA: ICD-10-CM

## 2024-12-11 DIAGNOSIS — R20.2 PARESTHESIA: Primary | ICD-10-CM

## 2024-12-11 PROCEDURE — 99000 SPECIMEN HANDLING OFFICE-LAB: CPT | Performed by: PATHOLOGY

## 2024-12-11 PROCEDURE — 36415 COLL VENOUS BLD VENIPUNCTURE: CPT | Performed by: PATHOLOGY

## 2024-12-11 PROCEDURE — 99205 OFFICE O/P NEW HI 60 MIN: CPT | Performed by: STUDENT IN AN ORGANIZED HEALTH CARE EDUCATION/TRAINING PROGRAM

## 2024-12-11 PROCEDURE — 84207 ASSAY OF VITAMIN B-6: CPT | Mod: 90 | Performed by: PATHOLOGY

## 2024-12-11 PROCEDURE — 84425 ASSAY OF VITAMIN B-1: CPT | Mod: 90 | Performed by: PATHOLOGY

## 2024-12-11 ASSESSMENT — PAIN SCALES - GENERAL: PAINLEVEL_OUTOF10: NO PAIN (0)

## 2024-12-11 NOTE — PROGRESS NOTES
CHIEF COMPLAINT / REASON FOR VISIT  Burning pain in the feet    Referred by Dr. Samuels ([facility])    HISTORY OF PRESENT ILLNESS   is a 54 year old male presenting to [facility] for evaluation of burning pain in the feet. Patient is here by himself.     His past medical history is significant for prediabetes, hemoglobin A1c 6.2-6.7 in the past 3 years.  He is currently on diet modification with continuous glucose monitoring.  He also has history of provoked DVT in the setting of immobilization (bedrest after fractured left patella x 3 weeks), previously on Eliquis for 1-1.5 year, currently not on anticoagulant.     Mr. Olvera experienced 1 episode of severe burning sensation in the bottom of both feet that lasted a whole night about 3 months ago.  Symptoms were severe to the point that he could not fall asleep.  He tried topical cream the next day, which helped relieve the symptoms.  He denies persistent numbness or tingling sensation in the feet.  He could not identify any clear trigger for that event other than working long hours the day prior to symptom onset.  He is concerned that he may have developed peripheral neuropathy and was referred for further evaluation.  He has been monitoring his glucose level on continuous glucose monitoring very closely.  He noted that his glucose would fluctuate up to 200s multiple times per day but has improved to 70s to 130s range with diet modification.  He also lost 8 pounds in the past 3 months from diet modification.    He denies orthostatic lightheadedness.  No chronic diarrhea.  No erectile dysfunction.    He works as a .  He tries to walk 6,000-10,000 steps per day to exercise.  He is a strict vegetarian and takes daily multivitamin supplement.    Prior investigations  - EMG 11/25/2024 (Dr. Melgoza): There is no electrophysiologic evidence of a large fiber peripheral neuropathy.  Bilateral sural sensory nerve action potentials  were 37  V.  -Hemoglobin A1c 6.2-6.7% in the past 3 years  -Vitamin B12 694  -TSH slightly elevated at 4.39 January 2024, T4 was normal  -Ultrasound of lower extremities did not show any evidence of deep vein thrombosis.  There was nonocclusive thrombus within the left femoral and popliteal veins, similar to prior exams.    REVIEW OF SYSTEMS  All negative except for what indicated in the HPI. The following portions of the patient's history were reviewed and updated as appropriate: allergies, current medications, family history, medical history, surgical history, social history, and problem list.     PAST MEDICAL/SURGICAL HISTORY   Past Medical History:   Diagnosis Date    Chest pain 12/7/2010    Dandruff 12/7/2010    Degenerative disc disease 1/1/2003    Hyperlipidemia LDL goal <100 9/2/2011    Left hemiparesis (H) 12/22/2016    LFTs abnormal 6/3/2011     (Problem list name updated by automated process. Provider to review and confirm.)    Sensory hearing loss, bilateral 1/11    Skin tags 12/7/2010    Type 2 diabetes, HbA1c goal < 7% (H) 9/2/2011    Vertigo 12/7/2010     Past Surgical History:   Procedure Laterality Date    NO HISTORY OF SURGERY          MEDICATIONS    Current Outpatient Medications:     carboxymethylcellulose PF (REFRESH PLUS) 0.5 % ophthalmic solution, Place 1 drop into both eyes 3 times daily as needed for dry eyes., Disp: 30 each, Rfl: 11    Continuous Glucose Sensor (FREESTYLE ABEBE 3 PLUS SENSOR) MISC, Change every 15 days., Disp: 6 each, Rfl: 1    ketoconazole (NIZORAL) 2 % external shampoo, Apply topically three times a week (Patient not taking: Reported on 9/6/2024), Disp: 120 mL, Rfl: 11    Multiple Vitamin (MULTI-VITAMINS) TABS, Take 1 tablet by mouth daily, Disp: , Rfl:     Vitamin D, Cholecalciferol, 25 MCG (1000 UT) TABS, Take 2,000 Units by mouth, Disp: , Rfl:     ALLERGIES:  Allergies   Allergen Reactions    Penicillins Rash       PHYSICAL EXAM    NEUROLOGICAL EXAMINATION  Mental  status: normal.  Speech: normal.  Muscle strength: Normal muscle strength 5/5 proximally and distally in upper and lower limbs.  Sensation: Intact sensation to light touch, pinprick, cold temperature, vibration, and joint proprioception in upper and lower limbs.  Deep tendon reflexes: Normal reflexes in the upper limbs, slightly brisk in the knees, normal bilateral ankle reflexes. No clonus. Downgoing toes bilaterally.   High arched feet: Absent  Hammertoes: Absent  Coordination: normal rapid alternating movements and finger to nose testing  Gait: normal.  Walk on heels: yes, bilaterally.  Walk on toes: yes, bilaterally.    Getting up from seated position without pushing on chair: yes.  Posture: normal.  Romberg: negative.    ASSESSMENT / PLAN  #1 Dyesthesia in the feet with no objective evidence of neuropathy  #2 Prediabetes     Mr. Olvera's neurological exam today is normal.  He has normal sensation to both small and large modalities in the toes.  Muscle strength and reflexes are normal.  These findings correlate with his normal EMG/nerve conduction study, which did not show any evidence of a large fiber peripheral neuropathy.  I reassured him that I do not see any evidence of large- or small-fiber peripheral neuropathy or nerve damage at this time. I suspect the episode of dysesthesia that he has could be in the setting of glucose fluctuation/glucose intolerance in the setting of prediabetes. He has not had any further episodes in the past 3 months after diet modification and I recommend that he continue to do so.  He should avoid episodes of fluctuation of glucose as well as developing diabetes.  He should continue to stay physically active.  Lastly, he is very concerned that he may have developed vitamin deficiency given he is a strict vegetarian.  His vitamin B12 has been normal in the past.  I will add vitamin B1 and B6 level today.  I will contact him through Sp[employer] messages regarding the results.      Multiple questions were answered.  No need to follow-up in neuromuscular clinic.    I spent a total of 60 minutes on the day of the visit for chart review, face-to-face visit, counseling/coordination of care, and documentation. Please see the note for further information on patient assessment and treatment.       PATIENT EDUCATION  Ready to learn, no apparent learning barriers were identified; learning preferences include listening.  Explained diagnosis and treatment plan; patient expressed understanding of the content.

## 2024-12-11 NOTE — NURSING NOTE
Chief Complaint   Patient presents with    New Patient     BP (!) 137/92 (BP Location: Right arm, Patient Position: Sitting, Cuff Size: Adult Regular)   Pulse 88   Resp 16   SpO2 98%     DIYA JUAN M

## 2024-12-11 NOTE — LETTER
12/11/2024       RE: Tanner Olvera  [address]  [facility] [address]     Dear Colleague,    Thank you for referring your patient, Tanner Olvera, to the [facility] [place] at [facility]. Please see a copy of my visit note below.    CHIEF COMPLAINT / REASON FOR VISIT  Burning pain in the feet    Referred by Dr. Samuels (Family Medicine)    HISTORY OF PRESENT ILLNESS   is a 54 year old male presenting to [facility] Clinic for evaluation of burning pain in the feet. Patient is here by himself.     His past medical history is significant for prediabetes, hemoglobin A1c 6.2-6.7 in the past 3 years.  He is currently on diet modification with continuous glucose monitoring.  He also has history of provoked DVT in the setting of immobilization (bedrest after fractured left patella x 3 weeks), previously on Eliquis for 1-1.5 year, currently not on anticoagulant.     Mr. Olvera experienced 1 episode of severe burning sensation in the bottom of both feet that lasted a whole night about 3 months ago.  Symptoms were severe to the point that he could not fall asleep.  He tried topical cream the next day, which helped relieve the symptoms.  He denies persistent numbness or tingling sensation in the feet.  He could not identify any clear trigger for that event other than working long hours the day prior to symptom onset.  He is concerned that he may have developed peripheral neuropathy and was referred for further evaluation.  He has been monitoring his glucose level on continuous glucose monitoring very closely.  He noted that his glucose would fluctuate up to 200s multiple times per day but has improved to 70s to 130s range with diet modification.  He also lost 8 pounds in the past 3 months from diet modification.    He denies orthostatic lightheadedness.  No chronic diarrhea.  No erectile dysfunction.    He works as a  .  He tries to walk 6,000-10,000 steps per day to exercise.  He is a strict vegetarian and takes daily multivitamin supplement.    Prior investigations  - EMG 11/25/2024 (Dr. Melgoza): There is no electrophysiologic evidence of a large fiber peripheral neuropathy.  Bilateral sural sensory nerve action potentials were 37  V.  -Hemoglobin A1c 6.2-6.7% in the past 3 years  -Vitamin B12 694  -TSH slightly elevated at 4.39 January 2024, T4 was normal  -Ultrasound of lower extremities did not show any evidence of deep vein thrombosis.  There was nonocclusive thrombus within the left femoral and popliteal veins, similar to prior exams.    REVIEW OF SYSTEMS  All negative except for what indicated in the HPI. The following portions of the patient's history were reviewed and updated as appropriate: allergies, current medications, family history, medical history, surgical history, social history, and problem list.     PAST MEDICAL/SURGICAL HISTORY   Past Medical History:   Diagnosis Date     Chest pain 12/7/2010     Dandruff 12/7/2010     Degenerative disc disease 1/1/2003     Hyperlipidemia LDL goal <100 9/2/2011     Left hemiparesis (H) 12/22/2016     LFTs abnormal 6/3/2011     (Problem list name updated by automated process. Provider to review and confirm.)     Sensory hearing loss, bilateral 1/11     Skin tags 12/7/2010     Type 2 diabetes, HbA1c goal < 7% (H) 9/2/2011     Vertigo 12/7/2010     Past Surgical History:   Procedure Laterality Date     NO HISTORY OF SURGERY          MEDICATIONS    Current Outpatient Medications:      carboxymethylcellulose PF (REFRESH PLUS) 0.5 % ophthalmic solution, Place 1 drop into both eyes 3 times daily as needed for dry eyes., Disp: 30 each, Rfl: 11     Continuous Glucose Sensor (FREESTYLE ABEBE 3 PLUS SENSOR) MISC, Change every 15 days., Disp: 6 each, Rfl: 1     ketoconazole (NIZORAL) 2 % external shampoo, Apply topically three times a week (Patient not taking: Reported  on 9/6/2024), Disp: 120 mL, Rfl: 11     Multiple Vitamin (MULTI-VITAMINS) TABS, Take 1 tablet by mouth daily, Disp: , Rfl:      Vitamin D, Cholecalciferol, 25 MCG (1000 UT) TABS, Take 2,000 Units by mouth, Disp: , Rfl:     ALLERGIES:  Allergies   Allergen Reactions     Penicillins Rash       PHYSICAL EXAM    NEUROLOGICAL EXAMINATION  Mental status: normal.  Speech: normal.  Muscle strength: Normal muscle strength 5/5 proximally and distally in upper and lower limbs.  Sensation: Intact sensation to light touch, pinprick, cold temperature, vibration, and joint proprioception in upper and lower limbs.  Deep tendon reflexes: Normal reflexes in the upper limbs, slightly brisk in the knees, normal bilateral ankle reflexes. No clonus. Downgoing toes bilaterally.   High arched feet: Absent  Hammertoes: Absent  Coordination: normal rapid alternating movements and finger to nose testing  Gait: normal.  Walk on heels: yes, bilaterally.  Walk on toes: yes, bilaterally.    Getting up from seated position without pushing on chair: yes.  Posture: normal.  Romberg: negative.    ASSESSMENT / PLAN  #1 Dyesthesia in the feet with no objective evidence of neuropathy  #2 Prediabetes     Mr. Olvera's neurological exam today is normal.  He has normal sensation to both small and large modalities in the toes.  Muscle strength and reflexes are normal.  These findings correlate with his normal EMG/nerve conduction study, which did not show any evidence of a large fiber peripheral neuropathy.  I reassured him that I do not see any evidence of large- or small-fiber peripheral neuropathy or nerve damage at this time. I suspect the episode of dysesthesia that he has could be in the setting of glucose fluctuation/glucose intolerance in the setting of prediabetes. He has not had any further episodes in the past 3 months after diet modification and I recommend that he continue to do so.  He should avoid episodes of fluctuation of glucose as well  as developing diabetes.  He should continue to stay physically active.  Lastly, he is very concerned that he may have developed vitamin deficiency given he is a strict vegetarian.  His vitamin B12 has been normal in the past.  I will add vitamin B1 and B6 level today.  I will contact him through [employer] messages regarding the results.     Multiple questions were answered.  No need to follow-up in neuromuscular clinic.    I spent a total of 60 minutes on the day of the visit for chart review, face-to-face visit, counseling/coordination of care, and documentation. Please see the note for further information on patient assessment and treatment.       PATIENT EDUCATION  Ready to learn, no apparent learning barriers were identified; learning preferences include listening.  Explained diagnosis and treatment plan; patient expressed understanding of the content.      Again, thank you for allowing me to participate in the care of your patient.      Sincerely,    Peter Rangel MD

## 2024-12-13 LAB — PYRIDOXAL PHOS SERPL-SCNC: 257.8 NMOL/L

## 2024-12-14 LAB — VIT B1 PYROPHOSHATE BLD-SCNC: 140 NMOL/L

## 2024-12-17 ENCOUNTER — PATIENT OUTREACH (OUTPATIENT)
Dept: GASTROENTEROLOGY | Facility: CLINIC | Age: 54
End: 2024-12-17
Payer: COMMERCIAL

## 2024-12-17 PROBLEM — D12.6 ADENOMATOUS COLON POLYP: Status: ACTIVE | Noted: 2024-12-17

## 2025-01-06 ENCOUNTER — OFFICE VISIT (OUTPATIENT)
Dept: NEUROLOGY | Facility: CLINIC | Age: 55
End: 2025-01-06
Payer: COMMERCIAL

## 2025-01-06 ENCOUNTER — LAB (OUTPATIENT)
Dept: LAB | Facility: CLINIC | Age: 55
End: 2025-01-06
Payer: COMMERCIAL

## 2025-01-06 VITALS
RESPIRATION RATE: 16 BRPM | OXYGEN SATURATION: 100 % | DIASTOLIC BLOOD PRESSURE: 90 MMHG | HEART RATE: 112 BPM | SYSTOLIC BLOOD PRESSURE: 134 MMHG

## 2025-01-06 DIAGNOSIS — R20.8 DYSESTHESIA: ICD-10-CM

## 2025-01-06 DIAGNOSIS — E11.9 DIABETES MELLITUS WITHOUT COMPLICATION (H): Primary | ICD-10-CM

## 2025-01-06 DIAGNOSIS — R25.3 FASCICULATIONS: ICD-10-CM

## 2025-01-06 LAB
ALBUMIN SERPL BCG-MCNC: 4.6 G/DL (ref 3.5–5.2)
ALP SERPL-CCNC: 102 U/L (ref 40–150)
ALT SERPL W P-5'-P-CCNC: 23 U/L (ref 0–70)
ANION GAP SERPL CALCULATED.3IONS-SCNC: 11 MMOL/L (ref 7–15)
AST SERPL W P-5'-P-CCNC: 22 U/L (ref 0–45)
BILIRUB SERPL-MCNC: 0.3 MG/DL
BUN SERPL-MCNC: 7.1 MG/DL (ref 6–20)
CALCIUM SERPL-MCNC: 9.3 MG/DL (ref 8.8–10.4)
CHLORIDE SERPL-SCNC: 101 MMOL/L (ref 98–107)
CREAT SERPL-MCNC: 0.75 MG/DL (ref 0.67–1.17)
EGFRCR SERPLBLD CKD-EPI 2021: >90 ML/MIN/1.73M2
GLUCOSE SERPL-MCNC: 114 MG/DL (ref 70–99)
HCO3 SERPL-SCNC: 27 MMOL/L (ref 22–29)
MAGNESIUM SERPL-MCNC: 2.3 MG/DL (ref 1.7–2.3)
POTASSIUM SERPL-SCNC: 4 MMOL/L (ref 3.4–5.3)
PROT SERPL-MCNC: 7.6 G/DL (ref 6.4–8.3)
SODIUM SERPL-SCNC: 139 MMOL/L (ref 135–145)

## 2025-01-06 PROCEDURE — 80053 COMPREHEN METABOLIC PANEL: CPT | Performed by: PATHOLOGY

## 2025-01-06 PROCEDURE — 99214 OFFICE O/P EST MOD 30 MIN: CPT | Performed by: STUDENT IN AN ORGANIZED HEALTH CARE EDUCATION/TRAINING PROGRAM

## 2025-01-06 PROCEDURE — 36415 COLL VENOUS BLD VENIPUNCTURE: CPT | Performed by: PATHOLOGY

## 2025-01-06 PROCEDURE — 83735 ASSAY OF MAGNESIUM: CPT | Performed by: PATHOLOGY

## 2025-01-06 RX ORDER — CHLORAL HYDRATE 500 MG
2 CAPSULE ORAL DAILY
COMMUNITY

## 2025-01-06 ASSESSMENT — PAIN SCALES - GENERAL: PAINLEVEL_OUTOF10: NO PAIN (0)

## 2025-01-06 NOTE — NURSING NOTE
Chief Complaint   Patient presents with    RECHECK     BP (!) 134/90 (BP Location: Left arm, Patient Position: Sitting, Cuff Size: Adult Regular)   Pulse 112   Resp 16   SpO2 100%   Zully Bradley

## 2025-01-06 NOTE — LETTER
1/6/2025       RE: Tanner Olvera  [address]  [facility] [address]     Dear Colleague,    Thank you for referring your patient, Tanner Olvera, to the [facility]UR[facility] [place] at [facility]. Please see a copy of my visit note below.    CHIEF COMPLAINT / REASON FOR VISIT  Multiple non-specific symptoms.     HISTORY OF PRESENT ILLNESS   was recently seen in this clinic on 12/11/2024. His neurological exam was normal at that time. He returned today as he has noticed multiple symptoms that he would like to discuss with me.     - He has been noticing muscle twitching in several parts of the body. The twitching is not painful. He continues to have no focal weakness.   - He experienced jerking of arms and legs that occur during night time that occur 2-3 times/month.   - He reports shock-like pain in the tongue when he lies down on the left side of face. This occurred 2-3 times in the past month, lasted few seconds then spontaneously go away without residual symptoms.   - He is getting more forgetful. Sometimes he forgot what he was doing and had to stop to think about it. He typically was able to trace back eventually.   - He occasionally gets mild burning sensation in the feet but not as severe as before and symptoms are infrequent.     ASSESSMENT / PLAN  #1 Dyesthesia in the feet with no objective evidence of neuropathy  #2 Prediabetes  #3 Benign fasciculations     Mr. Olvera is concerned that the symptoms above could be a sign of ALS or MS. I discussed with him that fasciculations in the setting of normal strength on exam and normal EMG findings are likely benign fasciculations. He does not have any sign of motor neuron disorder or central demyelinating process on his exam. 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 I do not have  any neurological explanation for his tongue pain but I recommend that he touches base with his dentist to rule out dental causes. Again, he is very concerned that he may have developed vitamin deficiency or electrolyte imbalance given he is a strict vegetarian. I reassured him that vitamin B12 is in a normal range. Vitamin B6 was elevated and he has already stopped vitamin B6 supplement. Because he is having fasciculations, we will check potassium and magnesium levels today.     Multiple questions were answered.  He would like to follow-up in 12 months.     I spent a total of 30 minutes on the day of the visit for chart review, face-to-face visit, counseling/coordination of care, and documentation. Please see the note for further information on patient assessment and treatment.       PATIENT EDUCATION  Ready to learn, no apparent learning barriers were identified; learning preferences include listening.  Explained diagnosis and treatment plan; patient expressed understanding of the content.      Again, thank you for allowing me to participate in the care of your patient.      Sincerely,    Peter Rangel MD

## 2025-01-06 NOTE — PROGRESS NOTES
CHIEF COMPLAINT / REASON FOR VISIT  Multiple non-specific symptoms.     HISTORY OF PRESENT ILLNESS   was recently seen in this clinic on 12/11/2024. His neurological exam was normal at that time. He returned today as he has noticed multiple symptoms that he would like to discuss with me.     - He has been noticing muscle twitching in several parts of the body. The twitching is not painful. He continues to have no focal weakness.   - He experienced jerking of arms and legs that occur during night time that occur 2-3 times/month.   - He reports shock-like pain in the tongue when he lies down on the left side of face. This occurred 2-3 times in the past month, lasted few seconds then spontaneously go away without residual symptoms.   - He is getting more forgetful. Sometimes he forgot what he was doing and had to stop to think about it. He typically was able to trace back eventually.   - He occasionally gets mild burning sensation in the feet but not as severe as before and symptoms are infrequent.     ASSESSMENT / PLAN  #1 Dyesthesia in the feet with no objective evidence of neuropathy  #2 Prediabetes  #3 Benign fasciculations     Mr. Olvera is concerned that the symptoms above could be a sign of ALS or MS. I discussed with him that fasciculations in the setting of normal strength on exam and normal EMG findings are likely benign fasciculations. He does not have any sign of motor neuron disorder or central demyelinating process on his exam. 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 I do not have any neurological explanation for his tongue pain but I recommend that he touches base with his dentist to rule out dental causes. Again, he is very concerned that he may have developed vitamin deficiency or electrolyte imbalance given he is a strict vegetarian. I reassured him that vitamin B12 is in a normal range.  Vitamin B6 was elevated and he has already stopped vitamin B6 supplement. Because he is having fasciculations, we will check potassium and magnesium levels today.     Multiple questions were answered.  He would like to follow-up in 12 months.     I spent a total of 30 minutes on the day of the visit for chart review, face-to-face visit, counseling/coordination of care, and documentation. Please see the note for further information on patient assessment and treatment.       PATIENT EDUCATION  Ready to learn, no apparent learning barriers were identified; learning preferences include listening.  Explained diagnosis and treatment plan; patient expressed understanding of the content.

## 2025-03-02 ENCOUNTER — HEALTH MAINTENANCE LETTER (OUTPATIENT)
Age: 55
End: 2025-03-02

## 2025-03-26 DIAGNOSIS — E11.9 DIABETES MELLITUS WITHOUT COMPLICATION (H): ICD-10-CM

## 2025-03-26 RX ORDER — HYDROCHLOROTHIAZIDE 12.5 MG/1
CAPSULE ORAL
Qty: 6 EACH | Refills: 3 | Status: SHIPPED | OUTPATIENT
Start: 2025-03-26

## 2025-04-25 ENCOUNTER — MYC REFILL (OUTPATIENT)
Dept: FAMILY MEDICINE | Facility: CLINIC | Age: 55
End: 2025-04-25
Payer: COMMERCIAL

## 2025-04-25 DIAGNOSIS — E11.9 DIABETES MELLITUS WITHOUT COMPLICATION (H): ICD-10-CM

## 2025-04-28 RX ORDER — HYDROCHLOROTHIAZIDE 12.5 MG/1
CAPSULE ORAL
Qty: 6 EACH | Refills: 3 | OUTPATIENT
Start: 2025-04-28

## 2025-04-29 ENCOUNTER — TRANSFERRED RECORDS (OUTPATIENT)
Dept: MULTI SPECIALTY CLINIC | Facility: CLINIC | Age: 55
End: 2025-04-29
Payer: COMMERCIAL

## 2025-04-29 LAB — RETINOPATHY: NORMAL

## 2025-05-05 SDOH — HEALTH STABILITY: PHYSICAL HEALTH: ON AVERAGE, HOW MANY DAYS PER WEEK DO YOU ENGAGE IN MODERATE TO STRENUOUS EXERCISE (LIKE A BRISK WALK)?: 3 DAYS

## 2025-05-05 SDOH — HEALTH STABILITY: PHYSICAL HEALTH: ON AVERAGE, HOW MANY MINUTES DO YOU ENGAGE IN EXERCISE AT THIS LEVEL?: 30 MIN

## 2025-05-05 ASSESSMENT — SOCIAL DETERMINANTS OF HEALTH (SDOH): HOW OFTEN DO YOU GET TOGETHER WITH FRIENDS OR RELATIVES?: ONCE A WEEK

## 2025-05-08 ENCOUNTER — OFFICE VISIT (OUTPATIENT)
Dept: FAMILY MEDICINE | Facility: CLINIC | Age: 55
End: 2025-05-08
Payer: COMMERCIAL

## 2025-05-08 ENCOUNTER — RESULTS FOLLOW-UP (OUTPATIENT)
Dept: FAMILY MEDICINE | Facility: CLINIC | Age: 55
End: 2025-05-08

## 2025-05-08 VITALS
BODY MASS INDEX: 21.37 KG/M2 | HEART RATE: 82 BPM | WEIGHT: 113.2 LBS | RESPIRATION RATE: 16 BRPM | DIASTOLIC BLOOD PRESSURE: 89 MMHG | OXYGEN SATURATION: 99 % | SYSTOLIC BLOOD PRESSURE: 130 MMHG | HEIGHT: 61 IN | TEMPERATURE: 97 F

## 2025-05-08 DIAGNOSIS — E56.9 VITAMIN DEFICIENCY: ICD-10-CM

## 2025-05-08 DIAGNOSIS — E11.9 DIABETES MELLITUS WITHOUT COMPLICATION (H): ICD-10-CM

## 2025-05-08 DIAGNOSIS — E78.5 HYPERLIPIDEMIA LDL GOAL <100: ICD-10-CM

## 2025-05-08 DIAGNOSIS — Z00.00 HEALTHCARE MAINTENANCE: Primary | ICD-10-CM

## 2025-05-08 DIAGNOSIS — Z12.5 SCREENING FOR PROSTATE CANCER: ICD-10-CM

## 2025-05-08 DIAGNOSIS — R76.8 ANA POSITIVE: ICD-10-CM

## 2025-05-08 LAB
ALBUMIN SERPL BCG-MCNC: 4.7 G/DL (ref 3.5–5.2)
ALP SERPL-CCNC: 90 U/L (ref 40–150)
ALT SERPL W P-5'-P-CCNC: 32 U/L (ref 0–70)
ANION GAP SERPL CALCULATED.3IONS-SCNC: 11 MMOL/L (ref 7–15)
AST SERPL W P-5'-P-CCNC: 26 U/L (ref 0–45)
BILIRUB SERPL-MCNC: 0.4 MG/DL
BUN SERPL-MCNC: 8.9 MG/DL (ref 6–20)
CALCIUM SERPL-MCNC: 9.6 MG/DL (ref 8.8–10.4)
CHLORIDE SERPL-SCNC: 101 MMOL/L (ref 98–107)
CHOLEST SERPL-MCNC: 181 MG/DL
CREAT SERPL-MCNC: 0.78 MG/DL (ref 0.67–1.17)
CREAT UR-MCNC: 178 MG/DL
EGFRCR SERPLBLD CKD-EPI 2021: >90 ML/MIN/1.73M2
ERYTHROCYTE [DISTWIDTH] IN BLOOD BY AUTOMATED COUNT: 12.7 % (ref 10–15)
EST. AVERAGE GLUCOSE BLD GHB EST-MCNC: 123 MG/DL
FASTING STATUS PATIENT QL REPORTED: ABNORMAL
FASTING STATUS PATIENT QL REPORTED: ABNORMAL
GLUCOSE SERPL-MCNC: 104 MG/DL (ref 70–99)
HBA1C MFR BLD: 5.9 % (ref 0–5.6)
HCO3 SERPL-SCNC: 27 MMOL/L (ref 22–29)
HCT VFR BLD AUTO: 44.2 % (ref 40–53)
HDLC SERPL-MCNC: 49 MG/DL
HGB BLD-MCNC: 15 G/DL (ref 13.3–17.7)
LDLC SERPL CALC-MCNC: 116 MG/DL
MCH RBC QN AUTO: 28.9 PG (ref 26.5–33)
MCHC RBC AUTO-ENTMCNC: 33.9 G/DL (ref 31.5–36.5)
MCV RBC AUTO: 85 FL (ref 78–100)
MICROALBUMIN UR-MCNC: <12 MG/L
MICROALBUMIN/CREAT UR: NORMAL MG/G{CREAT}
NONHDLC SERPL-MCNC: 132 MG/DL
PLATELET # BLD AUTO: 296 10E3/UL (ref 150–450)
POTASSIUM SERPL-SCNC: 4.7 MMOL/L (ref 3.4–5.3)
PROT SERPL-MCNC: 7.6 G/DL (ref 6.4–8.3)
PSA SERPL DL<=0.01 NG/ML-MCNC: 1.01 NG/ML (ref 0–3.5)
RBC # BLD AUTO: 5.19 10E6/UL (ref 4.4–5.9)
SODIUM SERPL-SCNC: 139 MMOL/L (ref 135–145)
TRIGL SERPL-MCNC: 82 MG/DL
VIT B12 SERPL-MCNC: 655 PG/ML (ref 232–1245)
WBC # BLD AUTO: 5.1 10E3/UL (ref 4–11)

## 2025-05-08 ASSESSMENT — PAIN SCALES - GENERAL: PAINLEVEL_OUTOF10: NO PAIN (0)

## 2025-05-08 NOTE — PROGRESS NOTES
Preventive Care Visit  [facility] LANA Samuels MD, Family Medicine  May 8, 2025      Assessment & Plan     Healthcare maintenance    - CBC with platelets; Future  - Comprehensive metabolic panel (BMP + Alb, Alk Phos, ALT, AST, Total. Bili, TP); Future  - Lipid panel reflex to direct LDL Fasting; Future  - Hemoglobin A1c; Future  - PSA, screen; Future    Screening for prostate cancer    - PSA, screen; Future    Diabetes mellitus without complication (H)  Has been improving, will review the lab and update pt   - Comprehensive metabolic panel (BMP + Alb, Alk Phos, ALT, AST, Total. Bili, TP); Future  - Lipid panel reflex to direct LDL Fasting; Future  - FOOT EXAM  - Hemoglobin A1c; Future  - Adult Nutrition  Referral; Future    Hyperlipidemia LDL goal <100  Stable   Keep monitoring   Will review the lab and update pt  - Comprehensive metabolic panel (BMP + Alb, Alk Phos, ALT, AST, Total. Bili, TP); Future  - Lipid panel reflex to direct LDL Fasting; Future  - Adult Nutrition  Referral; Future    GUSTAVO positive  Has mlid diffuse joint pain, will have him to see rheumatology for further evaluation   - Adult Rheumatology  Referral; Future    Vitamin deficiency  Has been fluctuation with multivitamin,will have him to recheck lab for further evaluation   - Vitamin B6; Future  - Vitamin B12; Future    Patient has been advised of split billing requirements and indicates understanding: Yes        Counseling  Appropriate preventive services were addressed with this patient via screening, questionnaire, or discussion as appropriate for fall prevention, nutrition, physical activity, Tobacco-use cessation, social engagement, weight loss and cognition.  Checklist reviewing preventive services available has been given to the patient.  Reviewed patient's diet, addressing concerns and/or questions.   He is at risk for lack of exercise and has been provided with information to  increase physical activity for the benefit of his well-being.           Vaughn Hart is a 54 year old, presenting for the following:  Physical (Fasting. )        5/8/2025     8:50 AM   Additional Questions   Roomed by Brenda MAURO        Via the Health Maintenance questionnaire, the patient has reported the following services have been completed -Eye Exam: [facility] 2025-04-29, this information has been sent to the abstraction team.    HPI       Diabetes Follow-up    How often are you checking your blood sugar? Continuous glucose monitor  What time of day are you checking your blood sugars (select all that apply)?  Not applicable  Have you had any blood sugars above 200?  No  Have you had any blood sugars below 70?  No  What symptoms do you notice when your blood sugar is low?  None  What concerns do you have today about your diabetes? None   Do you have any of these symptoms? (Select all that apply)  No numbness or tingling in feet.  No redness, sores or blisters on feet.  No complaints of excessive thirst.  No reports of blurry vision.  No significant changes to weight.      BP Readings from Last 2 Encounters:   05/08/25 130/89   01/06/25 (!) 134/90     Hemoglobin A1C (%)   Date Value   12/13/2024 6.2 (H)   09/06/2024 6.5 (H)   12/23/2020 6.3 (H)   12/11/2019 6.5 (H)     LDL Cholesterol Calculated (mg/dL)   Date Value   12/13/2024 125 (H)   09/27/2024 144 (H)   12/23/2020 151 (H)   12/11/2019 134 (H)           Advance Care Planning    Discussed advance care planning with patient; however, patient declined at this time.        5/5/2025   General Health   How would you rate your overall physical health? (!) FAIR   Feel stress (tense, anxious, or unable to sleep) Only a little   (!) STRESS CONCERN      5/5/2025   Nutrition   Three or more servings of calcium each day? (!) I DON'T KNOW   Diet: Vegetarian/vegan   How many servings of fruit and vegetables per day? (!) 2-3   How many sweetened beverages each day?  0-1         5/5/2025   Exercise   Days per week of moderate/strenous exercise 3 days   Average minutes spent exercising at this level 30 min         5/5/2025   Social Factors   Frequency of gathering with friends or relatives Once a week   Worry food won't last until get money to buy more No   Food not last or not have enough money for food? No   Do you have housing? (Housing is defined as stable permanent housing and does not include staying outside in a car, in a tent, in an abandoned building, in an overnight shelter, or couch-surfing.) Yes   Are you worried about losing your housing? No   Lack of transportation? No   Unable to get utilities (heat,electricity)? No         5/5/2025   Fall Risk   Fallen 2 or more times in the past year? No   Trouble with walking or balance? No          5/5/2025   Dental   Dentist two times every year? Yes         Today's PHQ-2 Score:       5/7/2025     1:35 PM   PHQ-2 ( 1999 [employer])   Q1: Little interest or pleasure in doing things 0   Q2: Feeling down, depressed or hopeless 0   PHQ-2 Score 0    Q1: Little interest or pleasure in doing things Not at all   Q2: Feeling down, depressed or hopeless Not at all   PHQ-2 Score 0       Patient-reported           5/5/2025   Substance Use   Alcohol more than 3/day or more than 7/wk No   Do you use any other substances recreationally? No     Social History     Tobacco Use    Smoking status: Never    Smokeless tobacco: Never    Tobacco comments:     Non Smoker   Vaping Use    Vaping status: Never Used   Substance Use Topics    Alcohol use: No     Comment: nothing    Drug use: No           5/5/2025   STI Screening   New sexual partner(s) since last STI/HIV test? No   ASCVD Risk   The 10-year ASCVD risk score (Sapna CRESPO, et al., 2019) is: 9.2%    Values used to calculate the score:      Age: 54 years      Sex: Male      Is Non- : No      Diabetic: Yes      Tobacco smoker: No      Systolic Blood Pressure: 130  mmHg      Is BP treated: No      HDL Cholesterol: 44 mg/dL      Total Cholesterol: 199 mg/dL           Reviewed and updated as needed this visit by Provider                    Past Medical History:   Diagnosis Date    Chest pain 12/7/2010    Dandruff 12/7/2010    Degenerative disc disease 1/1/2003    Hyperlipidemia LDL goal <100 9/2/2011    Left hemiparesis (H) 12/22/2016    LFTs abnormal 6/3/2011     (Problem list name updated by automated process. Provider to review and confirm.)    Sensory hearing loss, bilateral 1/11    Skin tags 12/7/2010    Type 2 diabetes, HbA1c goal < 7% (H) 9/2/2011    Vertigo 12/7/2010     Past Surgical History:   Procedure Laterality Date    COLONOSCOPY N/A 12/3/2024    Procedure: Colonoscopy;  Surgeon: Thiago Haynes DO;  Location: [facility] OR    COLONOSCOPY N/A 12/3/2024    Procedure: COLONOSCOPY, FLEXIBLE, WITH LESION REMOVAL USING SNARE;  Surgeon: Thiago Haynes DO;  Location: [facility] OR    NO HISTORY OF SURGERY       Labs reviewed in EPIC  BP Readings from Last 3 Encounters:   05/08/25 130/89   01/06/25 (!) 134/90   12/13/24 125/87    Wt Readings from Last 3 Encounters:   05/08/25 51.3 kg (113 lb 3.2 oz)   12/13/24 53.5 kg (117 lb 14.4 oz)   09/06/24 56.2 kg (124 lb)                  Patient Active Problem List   Diagnosis    Hyperlipidemia LDL goal <100    Family history of early CAD    Degenerative disc disease    Vitamin D deficiency    Abnormal thyroid blood test    Diabetes mellitus without complication (H)    Acute saddle pulmonary embolism without acute cor pulmonale (H)    Acute deep vein thrombosis (DVT) of femoral vein of left lower extremity (H)    Left patella fracture    Adenomatous colon polyp     Past Surgical History:   Procedure Laterality Date    COLONOSCOPY N/A 12/3/2024    Procedure: Colonoscopy;  Surgeon: Thiago Haynes DO;  Location: [facility]    COLONOSCOPY N/A 12/3/2024    Procedure: COLONOSCOPY, FLEXIBLE, WITH LESION REMOVAL USING SNARE;   Surgeon: Thiago Haynes, DO;  Location: [facility] OR    NO HISTORY OF SURGERY         Social History     Tobacco Use    Smoking status: Never    Smokeless tobacco: Never    Tobacco comments:     Non Smoker   Substance Use Topics    Alcohol use: No     Comment: nothing     Family History   Problem Relation Age of Onset    C.A.D. Father     Asthma No family hx of     Diabetes No family hx of     Hypertension No family hx of     Cancer - colorectal No family hx of     Prostate Cancer No family hx of     Anesthesia Reaction No family hx of     Blood Disease No family hx of     Eye Disorder No family hx of     Osteoporosis No family hx of     Thyroid Disease No family hx of     Glaucoma No family hx of     Macular Degeneration No family hx of          Current Outpatient Medications   Medication Sig Dispense Refill    Continuous Glucose Sensor (FREESTYLE ABEBE 3 PLUS SENSOR) MISC CHANGE SENSOR EVERY 15 DAYS 6 each 3    MAGNESIUM GLYCINATE ADVANCED PO Take 240 mg by mouth.      Vitamin D, Cholecalciferol, 25 MCG (1000 UT) TABS Take 2,000 Units by mouth      fish oil-omega-3 fatty acids 1000 MG capsule Take 2 g by mouth daily. (Patient not taking: Reported on 5/8/2025)      Multiple Vitamin (MULTI-VITAMINS) TABS Take 1 tablet by mouth daily (Patient not taking: Reported on 5/8/2025)       Allergies   Allergen Reactions    Penicillins Rash     Recent Labs   Lab Test 01/06/25  1224 12/13/24  0933 09/27/24  0808 09/06/24  1139 05/09/24  1018 01/30/24  0834 01/22/24  1722 06/13/23  0706 11/01/21  1449 12/23/20  1520 12/11/19  0956   A1C  --  6.2*  --  6.5* 6.3*  --  6.7* 6.3*   < > 6.3* 6.5*   LDL  --  125* 144*  --  106*   < >  --  91   < > 151* 134*   HDL  --  44 42  --  37*   < >  --  43   < > 41 36*   TRIG  --  152* 83  --  131   < >  --  129   < > 149 179*   ALT 23 28  --  37 37  --  58  --    < > 85* 73*   CR 0.75 0.80  --  0.83 0.81  --  0.76  --    < > 0.84 0.81   GFRESTIMATED >90 >90  --  >90 >90  --  >90  --    < >  ">90 >90   GFRESTBLACK  --   --   --   --   --   --   --   --   --  >90 >90   POTASSIUM 4.0 4.2  --  4.5 4.9  --  4.1  --    < > 4.3 4.4   TSH  --   --   --   --   --   --  4.39* 5.55*   < > 5.39* 4.44*    < > = values in this interval not displayed.               Review of Systems  Constitutional, HEENT, cardiovascular, pulmonary, GI, , musculoskeletal, neuro, skin, endocrine and psych systems are negative, except as otherwise noted.     Objective    Exam  /89   Pulse 82   Temp 97  F (36.1  C) (Temporal)   Resp 16   Ht 1.56 m (5' 1.42\")   Wt 51.3 kg (113 lb 3.2 oz)   SpO2 99%   BMI 21.10 kg/m     Estimated body mass index is 21.1 kg/m  as calculated from the following:    Height as of this encounter: 1.56 m (5' 1.42\").    Weight as of this encounter: 51.3 kg (113 lb 3.2 oz).    Physical Exam  GENERAL: alert and no distress  EYES: Eyes grossly normal to inspection, PERRL and conjunctivae and sclerae normal  HENT: ear canals and TM's normal, nose and mouth without ulcers or lesions  NECK: no adenopathy, no asymmetry, masses, or scars  RESP: lungs clear to auscultation - no rales, rhonchi or wheezes  CV: regular rate and rhythm, normal S1 S2, no S3 or S4, no murmur, click or rub, no peripheral edema  ABDOMEN: soft, nontender, no hepatosplenomegaly, no masses and bowel sounds normal  MS: no gross musculoskeletal defects noted, no edema  SKIN: no suspicious lesions or rashes  NEURO: Normal strength and tone, mentation intact and speech normal  BACK: no CVA tenderness, no paralumbar tenderness  PSYCH: mentation appears normal, affect normal/bright  LYMPH: no cervical, supraclavicular, axillary, or inguinal adenopathy  Diabetic foot exam: normal DP and PT pulses, no trophic changes or ulcerative lesions, and normal sensory exam        Signed Electronically by: Vishal Samuels MD    "

## 2025-05-12 ENCOUNTER — PATIENT OUTREACH (OUTPATIENT)
Dept: CARE COORDINATION | Facility: CLINIC | Age: 55
End: 2025-05-12
Payer: COMMERCIAL

## 2025-05-15 LAB — PYRIDOXAL PHOS SERPL-SCNC: 27.4 NMOL/L

## 2025-05-30 ENCOUNTER — TRANSFERRED RECORDS (OUTPATIENT)
Dept: HEALTH INFORMATION MANAGEMENT | Facility: CLINIC | Age: 55
End: 2025-05-30
Payer: COMMERCIAL

## 2025-07-03 ENCOUNTER — ALLIED HEALTH/NURSE VISIT (OUTPATIENT)
Dept: FAMILY MEDICINE | Facility: CLINIC | Age: 55
End: 2025-07-03

## 2025-07-03 DIAGNOSIS — Z23 ENCOUNTER FOR IMMUNIZATION: Primary | ICD-10-CM

## 2025-07-03 NOTE — PROGRESS NOTES
Prior to immunization administration, verified patients identity using patient s name and date of birth. Please see Immunization Activity for additional information.     Is the patient's temperature normal (100.5 or less)? Yes     Patient MEETS CRITERIA. PROCEED with vaccine administration.          7/3/2025   Zoster   Have you had a serious reaction to the shingles vaccine or something in the shingles vaccine? No   Do you have shingles now? No   Are you getting treatment for cancer, organ transplant, or bone marrow transplant? No   Do you have an autoimmune or inflammatory condition? No   Is the patient immunocompromised and wanting to complete the Shingles vaccine series in less than 2 months? No   Have you had Guillain-Salem syndrome within 6 weeks of getting a vaccine? No         Patient MEETS CRITERIA. PROCEED with vaccine administration.      Patient instructed to remain in clinic for 15 minutes afterwards, and to report any adverse reactions.      Link to Ancillary Visit Immunization Standing Orders SmartSet     Screening performed by Rhianna Pritchard CMA on 7/3/2025 at 9:06 AM.

## (undated) RX ORDER — SIMETHICONE 40MG/0.6ML
SUSPENSION, DROPS(FINAL DOSAGE FORM)(ML) ORAL
Status: DISPENSED
Start: 2024-12-03

## (undated) RX ORDER — FENTANYL CITRATE 50 UG/ML
INJECTION, SOLUTION INTRAMUSCULAR; INTRAVENOUS
Status: DISPENSED
Start: 2024-12-03